# Patient Record
Sex: FEMALE | Race: WHITE | Employment: FULL TIME | URBAN - NONMETROPOLITAN AREA
[De-identification: names, ages, dates, MRNs, and addresses within clinical notes are randomized per-mention and may not be internally consistent; named-entity substitution may affect disease eponyms.]

---

## 2017-01-03 ENCOUNTER — TELEPHONE (OUTPATIENT)
Dept: UROLOGY | Age: 42
End: 2017-01-03

## 2017-01-03 DIAGNOSIS — N20.0 KIDNEY STONE: ICD-10-CM

## 2017-01-03 DIAGNOSIS — N20.1 OBSTRUCTION OF LEFT URETEROPELVIC JUNCTION (UPJ) DUE TO STONE: Primary | ICD-10-CM

## 2017-01-03 DIAGNOSIS — Z01.818 PRE-OP TESTING: ICD-10-CM

## 2017-01-04 ENCOUNTER — TELEPHONE (OUTPATIENT)
Dept: UROLOGY | Age: 42
End: 2017-01-04

## 2017-01-10 ENCOUNTER — PROCEDURE VISIT (OUTPATIENT)
Dept: UROLOGY | Age: 42
End: 2017-01-10

## 2017-01-10 VITALS
BODY MASS INDEX: 32.1 KG/M2 | WEIGHT: 188 LBS | DIASTOLIC BLOOD PRESSURE: 80 MMHG | SYSTOLIC BLOOD PRESSURE: 140 MMHG | HEIGHT: 64 IN

## 2017-01-10 DIAGNOSIS — N20.0 KIDNEY STONE: Primary | ICD-10-CM

## 2017-01-10 LAB
BILIRUBIN URINE: NEGATIVE
BLOOD URINE, POC: ABNORMAL
CHARACTER, URINE: CLEAR
COLOR, URINE: YELLOW
GLUCOSE URINE: NEGATIVE MG/DL
KETONES, URINE: NEGATIVE
LEUKOCYTE CLUMPS, URINE: ABNORMAL
NITRITE, URINE: NEGATIVE
PH, URINE: 6
PROTEIN, URINE: 30 MG/DL
SPECIFIC GRAVITY, URINE: 1.02 (ref 1–1.03)
UROBILINOGEN, URINE: 0.2 EU/DL

## 2017-01-10 PROCEDURE — 99999 PR OFFICE/OUTPT VISIT,PROCEDURE ONLY: CPT | Performed by: UROLOGY

## 2017-01-10 PROCEDURE — 81003 URINALYSIS AUTO W/O SCOPE: CPT | Performed by: UROLOGY

## 2017-01-12 ENCOUNTER — PROCEDURE VISIT (OUTPATIENT)
Dept: UROLOGY | Age: 42
End: 2017-01-12

## 2017-01-12 VITALS — BODY MASS INDEX: 31.65 KG/M2 | WEIGHT: 190 LBS | HEIGHT: 65 IN

## 2017-01-12 DIAGNOSIS — N20.1 OBSTRUCTION OF LEFT URETEROPELVIC JUNCTION (UPJ) DUE TO STONE: Primary | ICD-10-CM

## 2017-01-12 DIAGNOSIS — N20.0 KIDNEY STONE: ICD-10-CM

## 2017-01-12 LAB
BILIRUBIN URINE: NEGATIVE
BLOOD URINE, POC: ABNORMAL
CHARACTER, URINE: CLEAR
COLOR, URINE: YELLOW
GLUCOSE URINE: NEGATIVE MG/DL
KETONES, URINE: NEGATIVE
LEUKOCYTE CLUMPS, URINE: ABNORMAL
NITRITE, URINE: NEGATIVE
PH, URINE: 7
PROTEIN, URINE: 30 MG/DL
SPECIFIC GRAVITY, URINE: 1.02 (ref 1–1.03)
UROBILINOGEN, URINE: 0.2 EU/DL

## 2017-01-12 PROCEDURE — 52310 CYSTOSCOPY AND TREATMENT: CPT | Performed by: UROLOGY

## 2017-01-12 PROCEDURE — 81003 URINALYSIS AUTO W/O SCOPE: CPT | Performed by: UROLOGY

## 2017-01-12 PROCEDURE — 99024 POSTOP FOLLOW-UP VISIT: CPT | Performed by: UROLOGY

## 2017-01-25 ENCOUNTER — TELEPHONE (OUTPATIENT)
Dept: UROLOGY | Age: 42
End: 2017-01-25

## 2018-03-30 ENCOUNTER — HOSPITAL ENCOUNTER (OUTPATIENT)
Dept: WOMENS IMAGING | Age: 43
Discharge: HOME | End: 2018-03-30
Payer: COMMERCIAL

## 2018-03-30 DIAGNOSIS — Z12.31 VISIT FOR SCREENING MAMMOGRAM: ICD-10-CM

## 2018-03-30 PROCEDURE — 77063 BREAST TOMOSYNTHESIS BI: CPT

## 2018-04-05 ENCOUNTER — HOSPITAL ENCOUNTER (OUTPATIENT)
Dept: WOMENS IMAGING | Age: 43
Discharge: HOME | End: 2018-04-05
Payer: COMMERCIAL

## 2018-04-05 DIAGNOSIS — Z12.31 VISIT FOR SCREENING MAMMOGRAM: ICD-10-CM

## 2018-04-05 DIAGNOSIS — R92.2 BREAST DENSITY: ICD-10-CM

## 2018-04-05 PROCEDURE — 76642 ULTRASOUND BREAST LIMITED: CPT

## 2018-04-05 PROCEDURE — G0279 TOMOSYNTHESIS, MAMMO: HCPCS

## 2018-04-16 ENCOUNTER — HOSPITAL ENCOUNTER (OUTPATIENT)
Dept: WOMENS IMAGING | Age: 43
Discharge: HOME | End: 2018-04-16
Payer: COMMERCIAL

## 2018-04-16 VITALS — TEMPERATURE: 98.6 F | SYSTOLIC BLOOD PRESSURE: 146 MMHG | DIASTOLIC BLOOD PRESSURE: 93 MMHG | HEART RATE: 74 BPM

## 2018-04-16 DIAGNOSIS — N63.10 MASS OF RIGHT BREAST: ICD-10-CM

## 2018-04-16 PROCEDURE — 88305 TISSUE EXAM BY PATHOLOGIST: CPT

## 2018-04-16 PROCEDURE — 77065 DX MAMMO INCL CAD UNI: CPT

## 2018-04-16 PROCEDURE — A4648 IMPLANTABLE TISSUE MARKER: HCPCS

## 2018-04-16 PROCEDURE — 19083 BX BREAST 1ST LESION US IMAG: CPT

## 2018-04-16 PROCEDURE — C1894 INTRO/SHEATH, NON-LASER: HCPCS

## 2018-04-16 RX ORDER — LEVOTHYROXINE AND LIOTHYRONINE 19; 4.5 UG/1; UG/1
30 TABLET ORAL DAILY
COMMUNITY

## 2018-10-22 ENCOUNTER — HOSPITAL ENCOUNTER (OUTPATIENT)
Dept: WOMENS IMAGING | Age: 43
Discharge: HOME | End: 2018-10-22
Payer: COMMERCIAL

## 2018-10-22 DIAGNOSIS — Z09 FOLLOW UP: ICD-10-CM

## 2018-10-22 DIAGNOSIS — N63.10 BREAST MASS, RIGHT: ICD-10-CM

## 2018-10-22 PROCEDURE — 76642 ULTRASOUND BREAST LIMITED: CPT

## 2019-02-05 ENCOUNTER — ANESTHESIA EVENT (OUTPATIENT)
Dept: OPERATING ROOM | Age: 44
End: 2019-02-05
Payer: COMMERCIAL

## 2019-02-05 ENCOUNTER — HOSPITAL ENCOUNTER (OUTPATIENT)
Age: 44
Setting detail: OBSERVATION
Discharge: HOME | End: 2019-02-06
Attending: UROLOGY | Admitting: UROLOGY
Payer: COMMERCIAL

## 2019-02-05 ENCOUNTER — ANESTHESIA (OUTPATIENT)
Dept: OPERATING ROOM | Age: 44
End: 2019-02-05
Payer: COMMERCIAL

## 2019-02-05 ENCOUNTER — APPOINTMENT (OUTPATIENT)
Dept: CT IMAGING | Age: 44
End: 2019-02-05
Payer: COMMERCIAL

## 2019-02-05 VITALS
SYSTOLIC BLOOD PRESSURE: 102 MMHG | DIASTOLIC BLOOD PRESSURE: 58 MMHG | RESPIRATION RATE: 4 BRPM | OXYGEN SATURATION: 94 %

## 2019-02-05 DIAGNOSIS — N20.1 URETEROLITHIASIS: Primary | ICD-10-CM

## 2019-02-05 DIAGNOSIS — N13.5 URETERAL STRICTURE: ICD-10-CM

## 2019-02-05 DIAGNOSIS — Q62.10 URETERAL STENOSIS: ICD-10-CM

## 2019-02-05 LAB
ALBUMIN SERPL-MCNC: 4.4 G/DL (ref 3.5–5.1)
ALP BLD-CCNC: 55 U/L (ref 38–126)
ALT SERPL-CCNC: 21 U/L (ref 11–66)
ANION GAP SERPL CALCULATED.3IONS-SCNC: 16 MEQ/L (ref 8–16)
AST SERPL-CCNC: 15 U/L (ref 5–40)
BACTERIA: ABNORMAL /HPF
BASOPHILS # BLD: 0.3 %
BASOPHILS ABSOLUTE: 0 THOU/MM3 (ref 0–0.1)
BILIRUB SERPL-MCNC: 0.2 MG/DL (ref 0.3–1.2)
BILIRUBIN DIRECT: < 0.2 MG/DL (ref 0–0.3)
BILIRUBIN URINE: ABNORMAL
BLOOD, URINE: ABNORMAL
BUN BLDV-MCNC: 14 MG/DL (ref 7–22)
CALCIUM SERPL-MCNC: 9.4 MG/DL (ref 8.5–10.5)
CASTS UA: ABNORMAL /LPF
CHARACTER, URINE: ABNORMAL
CHLORIDE BLD-SCNC: 104 MEQ/L (ref 98–111)
CO2: 21 MEQ/L (ref 23–33)
COLOR: ABNORMAL
CREAT SERPL-MCNC: 0.7 MG/DL (ref 0.4–1.2)
CRYSTALS, UA: ABNORMAL
EOSINOPHIL # BLD: 2.6 %
EOSINOPHILS ABSOLUTE: 0.3 THOU/MM3 (ref 0–0.4)
EPITHELIAL CELLS, UA: ABNORMAL /HPF
ERYTHROCYTE [DISTWIDTH] IN BLOOD BY AUTOMATED COUNT: 13.1 % (ref 11.5–14.5)
ERYTHROCYTE [DISTWIDTH] IN BLOOD BY AUTOMATED COUNT: 42.5 FL (ref 35–45)
GFR SERPL CREATININE-BSD FRML MDRD: > 90 ML/MIN/1.73M2
GLUCOSE BLD-MCNC: 129 MG/DL (ref 70–108)
GLUCOSE URINE: 100 MG/DL
HCT VFR BLD CALC: 42.1 % (ref 37–47)
HEMOGLOBIN: 13.5 GM/DL (ref 12–16)
ICTOTEST: NEGATIVE
IMMATURE GRANS (ABS): 0.06 THOU/MM3 (ref 0–0.07)
IMMATURE GRANULOCYTES: 0.6 %
KETONES, URINE: ABNORMAL
LEUKOCYTE ESTERASE, URINE: NEGATIVE
LIPASE: 40.1 U/L (ref 5.6–51.3)
LYMPHOCYTES # BLD: 19.6 %
LYMPHOCYTES ABSOLUTE: 2 THOU/MM3 (ref 1–4.8)
MCH RBC QN AUTO: 28.5 PG (ref 26–33)
MCHC RBC AUTO-ENTMCNC: 32.1 GM/DL (ref 32.2–35.5)
MCV RBC AUTO: 89 FL (ref 81–99)
MONOCYTES # BLD: 7 %
MONOCYTES ABSOLUTE: 0.7 THOU/MM3 (ref 0.4–1.3)
NITRITE, URINE: ABNORMAL
NUCLEATED RED BLOOD CELLS: 0 /100 WBC
OSMOLALITY CALCULATION: 283.4 MOSMOL/KG (ref 275–300)
PH UA: 5.5
PLATELET # BLD: 205 THOU/MM3 (ref 130–400)
PMV BLD AUTO: 10 FL (ref 9.4–12.4)
POTASSIUM SERPL-SCNC: 4.2 MEQ/L (ref 3.5–5.2)
PROTEIN UA: 100
RBC # BLD: 4.73 MILL/MM3 (ref 4.2–5.4)
RBC URINE: > 200 /HPF
SEG NEUTROPHILS: 69.9 %
SEGMENTED NEUTROPHILS ABSOLUTE COUNT: 7.3 THOU/MM3 (ref 1.8–7.7)
SODIUM BLD-SCNC: 141 MEQ/L (ref 135–145)
SPECIFIC GRAVITY, URINE: >= 1.03 (ref 1–1.03)
TOTAL PROTEIN: 7.1 G/DL (ref 6.1–8)
UROBILINOGEN, URINE: 1 EU/DL
WBC # BLD: 10.4 THOU/MM3 (ref 4.8–10.8)
WBC UA: ABNORMAL /HPF

## 2019-02-05 PROCEDURE — 52344 CYSTO/URETERO STRICTURE TX: CPT | Performed by: UROLOGY

## 2019-02-05 PROCEDURE — 3700000000 HC ANESTHESIA ATTENDED CARE: Performed by: UROLOGY

## 2019-02-05 PROCEDURE — 52345 CYSTO/URETERO W/UP STRICTURE: CPT | Performed by: UROLOGY

## 2019-02-05 PROCEDURE — 96361 HYDRATE IV INFUSION ADD-ON: CPT

## 2019-02-05 PROCEDURE — G0378 HOSPITAL OBSERVATION PER HR: HCPCS

## 2019-02-05 PROCEDURE — 36415 COLL VENOUS BLD VENIPUNCTURE: CPT

## 2019-02-05 PROCEDURE — 96374 THER/PROPH/DIAG INJ IV PUSH: CPT

## 2019-02-05 PROCEDURE — 96375 TX/PRO/DX INJ NEW DRUG ADDON: CPT

## 2019-02-05 PROCEDURE — 99999 PR OFFICE/OUTPT VISIT,PROCEDURE ONLY: CPT | Performed by: UROLOGY

## 2019-02-05 PROCEDURE — 6370000000 HC RX 637 (ALT 250 FOR IP): Performed by: NURSE PRACTITIONER

## 2019-02-05 PROCEDURE — 7100000000 HC PACU RECOVERY - FIRST 15 MIN: Performed by: UROLOGY

## 2019-02-05 PROCEDURE — 74176 CT ABD & PELVIS W/O CONTRAST: CPT

## 2019-02-05 PROCEDURE — 96376 TX/PRO/DX INJ SAME DRUG ADON: CPT

## 2019-02-05 PROCEDURE — 6360000002 HC RX W HCPCS: Performed by: NURSE PRACTITIONER

## 2019-02-05 PROCEDURE — 2580000003 HC RX 258: Performed by: NURSE PRACTITIONER

## 2019-02-05 PROCEDURE — 87086 URINE CULTURE/COLONY COUNT: CPT

## 2019-02-05 PROCEDURE — C1726 CATH, BAL DIL, NON-VASCULAR: HCPCS | Performed by: UROLOGY

## 2019-02-05 PROCEDURE — C1769 GUIDE WIRE: HCPCS | Performed by: UROLOGY

## 2019-02-05 PROCEDURE — 96372 THER/PROPH/DIAG INJ SC/IM: CPT

## 2019-02-05 PROCEDURE — 7100000001 HC PACU RECOVERY - ADDTL 15 MIN: Performed by: UROLOGY

## 2019-02-05 PROCEDURE — 80048 BASIC METABOLIC PNL TOTAL CA: CPT

## 2019-02-05 PROCEDURE — 3600000013 HC SURGERY LEVEL 3 ADDTL 15MIN: Performed by: UROLOGY

## 2019-02-05 PROCEDURE — 85025 COMPLETE CBC W/AUTO DIFF WBC: CPT

## 2019-02-05 PROCEDURE — 80076 HEPATIC FUNCTION PANEL: CPT

## 2019-02-05 PROCEDURE — 2709999900 HC NON-CHARGEABLE SUPPLY: Performed by: UROLOGY

## 2019-02-05 PROCEDURE — 2709999900 HC NON-CHARGEABLE SUPPLY

## 2019-02-05 PROCEDURE — 2500000003 HC RX 250 WO HCPCS: Performed by: NURSE ANESTHETIST, CERTIFIED REGISTERED

## 2019-02-05 PROCEDURE — 99999 PR OFFICE/OUTPT VISIT,PROCEDURE ONLY: CPT | Performed by: NURSE PRACTITIONER

## 2019-02-05 PROCEDURE — 81001 URINALYSIS AUTO W/SCOPE: CPT

## 2019-02-05 PROCEDURE — 6360000004 HC RX CONTRAST MEDICATION: Performed by: UROLOGY

## 2019-02-05 PROCEDURE — 6360000002 HC RX W HCPCS

## 2019-02-05 PROCEDURE — 82365 CALCULUS SPECTROSCOPY: CPT

## 2019-02-05 PROCEDURE — 6360000002 HC RX W HCPCS: Performed by: NURSE ANESTHETIST, CERTIFIED REGISTERED

## 2019-02-05 PROCEDURE — 52332 CYSTOSCOPY AND TREATMENT: CPT | Performed by: UROLOGY

## 2019-02-05 PROCEDURE — 94640 AIRWAY INHALATION TREATMENT: CPT

## 2019-02-05 PROCEDURE — 99285 EMERGENCY DEPT VISIT HI MDM: CPT

## 2019-02-05 PROCEDURE — C1773 RET DEV, INSERTABLE: HCPCS | Performed by: UROLOGY

## 2019-02-05 PROCEDURE — 3600000003 HC SURGERY LEVEL 3 BASE: Performed by: UROLOGY

## 2019-02-05 PROCEDURE — 6360000002 HC RX W HCPCS: Performed by: ANESTHESIOLOGY

## 2019-02-05 PROCEDURE — 2580000003 HC RX 258: Performed by: PHYSICIAN ASSISTANT

## 2019-02-05 PROCEDURE — 2580000003 HC RX 258: Performed by: NURSE ANESTHETIST, CERTIFIED REGISTERED

## 2019-02-05 PROCEDURE — APPSS180 APP SPLIT SHARED TIME > 60 MINUTES: Performed by: NURSE PRACTITIONER

## 2019-02-05 PROCEDURE — 83690 ASSAY OF LIPASE: CPT

## 2019-02-05 PROCEDURE — 99220 PR INITIAL OBSERVATION CARE/DAY 70 MINUTES: CPT | Performed by: UROLOGY

## 2019-02-05 PROCEDURE — C2617 STENT, NON-COR, TEM W/O DEL: HCPCS | Performed by: UROLOGY

## 2019-02-05 PROCEDURE — 3700000001 HC ADD 15 MINUTES (ANESTHESIA): Performed by: UROLOGY

## 2019-02-05 DEVICE — STENT URET 7FR L22-30CM PERCFLX HYDR+ SFT PGTL TAPR TIP: Type: IMPLANTABLE DEVICE | Site: URETER | Status: FUNCTIONAL

## 2019-02-05 RX ORDER — HYDROCODONE BITARTRATE AND ACETAMINOPHEN 5; 325 MG/1; MG/1
2 TABLET ORAL EVERY 4 HOURS PRN
Status: DISCONTINUED | OUTPATIENT
Start: 2019-02-05 | End: 2019-02-06

## 2019-02-05 RX ORDER — ONDANSETRON 2 MG/ML
INJECTION INTRAMUSCULAR; INTRAVENOUS
Status: COMPLETED
Start: 2019-02-05 | End: 2019-02-05

## 2019-02-05 RX ORDER — DIPHENHYDRAMINE HYDROCHLORIDE 50 MG/ML
12.5 INJECTION INTRAMUSCULAR; INTRAVENOUS
Status: DISCONTINUED | OUTPATIENT
Start: 2019-02-05 | End: 2019-02-05 | Stop reason: HOSPADM

## 2019-02-05 RX ORDER — FENTANYL CITRATE 50 UG/ML
50 INJECTION, SOLUTION INTRAMUSCULAR; INTRAVENOUS EVERY 5 MIN PRN
Status: DISCONTINUED | OUTPATIENT
Start: 2019-02-05 | End: 2019-02-05 | Stop reason: HOSPADM

## 2019-02-05 RX ORDER — 0.9 % SODIUM CHLORIDE 0.9 %
1000 INTRAVENOUS SOLUTION INTRAVENOUS ONCE
Status: COMPLETED | OUTPATIENT
Start: 2019-02-05 | End: 2019-02-05

## 2019-02-05 RX ORDER — SODIUM CHLORIDE 0.9 % (FLUSH) 0.9 %
10 SYRINGE (ML) INJECTION PRN
Status: DISCONTINUED | OUTPATIENT
Start: 2019-02-05 | End: 2019-02-05 | Stop reason: SDUPTHER

## 2019-02-05 RX ORDER — OXYBUTYNIN CHLORIDE 5 MG/1
5 TABLET ORAL EVERY 8 HOURS PRN
Status: DISCONTINUED | OUTPATIENT
Start: 2019-02-05 | End: 2019-02-06 | Stop reason: HOSPADM

## 2019-02-05 RX ORDER — SODIUM CHLORIDE 0.9 % (FLUSH) 0.9 %
10 SYRINGE (ML) INJECTION EVERY 12 HOURS SCHEDULED
Status: DISCONTINUED | OUTPATIENT
Start: 2019-02-05 | End: 2019-02-06 | Stop reason: HOSPADM

## 2019-02-05 RX ORDER — CIPROFLOXACIN 2 MG/ML
400 INJECTION, SOLUTION INTRAVENOUS EVERY 12 HOURS
Status: DISCONTINUED | OUTPATIENT
Start: 2019-02-05 | End: 2019-02-05

## 2019-02-05 RX ORDER — ROCURONIUM BROMIDE 10 MG/ML
INJECTION, SOLUTION INTRAVENOUS PRN
Status: DISCONTINUED | OUTPATIENT
Start: 2019-02-05 | End: 2019-02-05 | Stop reason: SDUPTHER

## 2019-02-05 RX ORDER — FENTANYL CITRATE 50 UG/ML
25 INJECTION, SOLUTION INTRAMUSCULAR; INTRAVENOUS EVERY 5 MIN PRN
Status: DISCONTINUED | OUTPATIENT
Start: 2019-02-05 | End: 2019-02-05 | Stop reason: HOSPADM

## 2019-02-05 RX ORDER — FAMOTIDINE 20 MG/1
40 TABLET, FILM COATED ORAL DAILY
Status: DISCONTINUED | OUTPATIENT
Start: 2019-02-05 | End: 2019-02-06 | Stop reason: HOSPADM

## 2019-02-05 RX ORDER — PROMETHAZINE HYDROCHLORIDE 25 MG/ML
12.5 INJECTION, SOLUTION INTRAMUSCULAR; INTRAVENOUS
Status: COMPLETED | OUTPATIENT
Start: 2019-02-05 | End: 2019-02-05

## 2019-02-05 RX ORDER — ONDANSETRON 2 MG/ML
4 INJECTION INTRAMUSCULAR; INTRAVENOUS EVERY 6 HOURS PRN
Status: DISCONTINUED | OUTPATIENT
Start: 2019-02-05 | End: 2019-02-06 | Stop reason: HOSPADM

## 2019-02-05 RX ORDER — METOCLOPRAMIDE HYDROCHLORIDE 5 MG/ML
10 INJECTION INTRAMUSCULAR; INTRAVENOUS
Status: DISCONTINUED | OUTPATIENT
Start: 2019-02-05 | End: 2019-02-05 | Stop reason: HOSPADM

## 2019-02-05 RX ORDER — GLYCOPYRROLATE 1 MG/5 ML
SYRINGE (ML) INTRAVENOUS PRN
Status: DISCONTINUED | OUTPATIENT
Start: 2019-02-05 | End: 2019-02-05 | Stop reason: SDUPTHER

## 2019-02-05 RX ORDER — KETOROLAC TROMETHAMINE 30 MG/ML
INJECTION, SOLUTION INTRAMUSCULAR; INTRAVENOUS
Status: COMPLETED
Start: 2019-02-05 | End: 2019-02-05

## 2019-02-05 RX ORDER — OXYCODONE HYDROCHLORIDE AND ACETAMINOPHEN 5; 325 MG/1; MG/1
1 TABLET ORAL EVERY 4 HOURS PRN
Status: DISCONTINUED | OUTPATIENT
Start: 2019-02-05 | End: 2019-02-05 | Stop reason: SDUPTHER

## 2019-02-05 RX ORDER — ACETAMINOPHEN 325 MG/1
650 TABLET ORAL EVERY 4 HOURS PRN
Status: DISCONTINUED | OUTPATIENT
Start: 2019-02-05 | End: 2019-02-05 | Stop reason: SDUPTHER

## 2019-02-05 RX ORDER — ACETAMINOPHEN 325 MG/1
650 TABLET ORAL EVERY 4 HOURS PRN
Status: DISCONTINUED | OUTPATIENT
Start: 2019-02-05 | End: 2019-02-06 | Stop reason: HOSPADM

## 2019-02-05 RX ORDER — MORPHINE SULFATE 4 MG/ML
4 INJECTION, SOLUTION INTRAMUSCULAR; INTRAVENOUS
Status: DISCONTINUED | OUTPATIENT
Start: 2019-02-05 | End: 2019-02-06 | Stop reason: HOSPADM

## 2019-02-05 RX ORDER — SODIUM CHLORIDE 0.9 % (FLUSH) 0.9 %
10 SYRINGE (ML) INJECTION EVERY 12 HOURS SCHEDULED
Status: DISCONTINUED | OUTPATIENT
Start: 2019-02-05 | End: 2019-02-05 | Stop reason: SDUPTHER

## 2019-02-05 RX ORDER — LIDOCAINE HYDROCHLORIDE 10 MG/ML
INJECTION, SOLUTION INFILTRATION; PERINEURAL PRN
Status: DISCONTINUED | OUTPATIENT
Start: 2019-02-05 | End: 2019-02-05 | Stop reason: SDUPTHER

## 2019-02-05 RX ORDER — DOCUSATE SODIUM 100 MG/1
100 CAPSULE, LIQUID FILLED ORAL 2 TIMES DAILY
Status: DISCONTINUED | OUTPATIENT
Start: 2019-02-05 | End: 2019-02-06 | Stop reason: HOSPADM

## 2019-02-05 RX ORDER — SERTRALINE HYDROCHLORIDE 100 MG/1
100 TABLET, FILM COATED ORAL DAILY
Status: DISCONTINUED | OUTPATIENT
Start: 2019-02-05 | End: 2019-02-06 | Stop reason: HOSPADM

## 2019-02-05 RX ORDER — TAMSULOSIN HYDROCHLORIDE 0.4 MG/1
0.4 CAPSULE ORAL DAILY
Status: DISCONTINUED | OUTPATIENT
Start: 2019-02-05 | End: 2019-02-06 | Stop reason: HOSPADM

## 2019-02-05 RX ORDER — NEOSTIGMINE METHYLSULFATE 1 MG/ML
INJECTION, SOLUTION INTRAVENOUS PRN
Status: DISCONTINUED | OUTPATIENT
Start: 2019-02-05 | End: 2019-02-05 | Stop reason: SDUPTHER

## 2019-02-05 RX ORDER — SODIUM CHLORIDE 9 MG/ML
INJECTION, SOLUTION INTRAVENOUS CONTINUOUS
Status: DISCONTINUED | OUTPATIENT
Start: 2019-02-05 | End: 2019-02-05 | Stop reason: SDUPTHER

## 2019-02-05 RX ORDER — FENTANYL CITRATE 50 UG/ML
INJECTION, SOLUTION INTRAMUSCULAR; INTRAVENOUS PRN
Status: DISCONTINUED | OUTPATIENT
Start: 2019-02-05 | End: 2019-02-05 | Stop reason: SDUPTHER

## 2019-02-05 RX ORDER — SODIUM CHLORIDE 9 MG/ML
INJECTION, SOLUTION INTRAVENOUS CONTINUOUS PRN
Status: DISCONTINUED | OUTPATIENT
Start: 2019-02-05 | End: 2019-02-05 | Stop reason: SDUPTHER

## 2019-02-05 RX ORDER — SODIUM CHLORIDE 9 MG/ML
INJECTION, SOLUTION INTRAVENOUS CONTINUOUS
Status: DISCONTINUED | OUTPATIENT
Start: 2019-02-05 | End: 2019-02-06 | Stop reason: HOSPADM

## 2019-02-05 RX ORDER — OXYCODONE HYDROCHLORIDE AND ACETAMINOPHEN 5; 325 MG/1; MG/1
2 TABLET ORAL EVERY 4 HOURS PRN
Status: DISCONTINUED | OUTPATIENT
Start: 2019-02-05 | End: 2019-02-05 | Stop reason: SDUPTHER

## 2019-02-05 RX ORDER — MEPERIDINE HYDROCHLORIDE 25 MG/ML
12.5 INJECTION INTRAMUSCULAR; INTRAVENOUS; SUBCUTANEOUS EVERY 5 MIN PRN
Status: DISCONTINUED | OUTPATIENT
Start: 2019-02-05 | End: 2019-02-05 | Stop reason: HOSPADM

## 2019-02-05 RX ORDER — LABETALOL HYDROCHLORIDE 5 MG/ML
5 INJECTION, SOLUTION INTRAVENOUS EVERY 10 MIN PRN
Status: DISCONTINUED | OUTPATIENT
Start: 2019-02-05 | End: 2019-02-05 | Stop reason: HOSPADM

## 2019-02-05 RX ORDER — KETOROLAC TROMETHAMINE 30 MG/ML
30 INJECTION, SOLUTION INTRAMUSCULAR; INTRAVENOUS ONCE
Status: COMPLETED | OUTPATIENT
Start: 2019-02-05 | End: 2019-02-05

## 2019-02-05 RX ORDER — SODIUM CHLORIDE 0.9 % (FLUSH) 0.9 %
10 SYRINGE (ML) INJECTION PRN
Status: DISCONTINUED | OUTPATIENT
Start: 2019-02-05 | End: 2019-02-06 | Stop reason: HOSPADM

## 2019-02-05 RX ORDER — LOSARTAN POTASSIUM 25 MG/1
25 TABLET ORAL DAILY
Status: DISCONTINUED | OUTPATIENT
Start: 2019-02-05 | End: 2019-02-06 | Stop reason: HOSPADM

## 2019-02-05 RX ORDER — TAMSULOSIN HYDROCHLORIDE 0.4 MG/1
0.4 CAPSULE ORAL DAILY
Status: DISCONTINUED | OUTPATIENT
Start: 2019-02-05 | End: 2019-02-05 | Stop reason: SDUPTHER

## 2019-02-05 RX ORDER — PROPOFOL 10 MG/ML
INJECTION, EMULSION INTRAVENOUS PRN
Status: DISCONTINUED | OUTPATIENT
Start: 2019-02-05 | End: 2019-02-05 | Stop reason: SDUPTHER

## 2019-02-05 RX ORDER — ONDANSETRON 2 MG/ML
4 INJECTION INTRAMUSCULAR; INTRAVENOUS ONCE
Status: COMPLETED | OUTPATIENT
Start: 2019-02-05 | End: 2019-02-05

## 2019-02-05 RX ORDER — ONDANSETRON 2 MG/ML
INJECTION INTRAMUSCULAR; INTRAVENOUS PRN
Status: DISCONTINUED | OUTPATIENT
Start: 2019-02-05 | End: 2019-02-05 | Stop reason: SDUPTHER

## 2019-02-05 RX ORDER — HYDROCODONE BITARTRATE AND ACETAMINOPHEN 5; 325 MG/1; MG/1
1 TABLET ORAL EVERY 4 HOURS PRN
Status: DISCONTINUED | OUTPATIENT
Start: 2019-02-05 | End: 2019-02-06

## 2019-02-05 RX ORDER — LEVOTHYROXINE AND LIOTHYRONINE 38; 9 UG/1; UG/1
30 TABLET ORAL DAILY
Status: DISCONTINUED | OUTPATIENT
Start: 2019-02-05 | End: 2019-02-06 | Stop reason: HOSPADM

## 2019-02-05 RX ORDER — DEXAMETHASONE SODIUM PHOSPHATE 4 MG/ML
INJECTION, SOLUTION INTRA-ARTICULAR; INTRALESIONAL; INTRAMUSCULAR; INTRAVENOUS; SOFT TISSUE PRN
Status: DISCONTINUED | OUTPATIENT
Start: 2019-02-05 | End: 2019-02-05 | Stop reason: SDUPTHER

## 2019-02-05 RX ORDER — MORPHINE SULFATE 2 MG/ML
2 INJECTION, SOLUTION INTRAMUSCULAR; INTRAVENOUS
Status: DISCONTINUED | OUTPATIENT
Start: 2019-02-05 | End: 2019-02-06 | Stop reason: HOSPADM

## 2019-02-05 RX ORDER — ONDANSETRON 2 MG/ML
4 INJECTION INTRAMUSCULAR; INTRAVENOUS EVERY 6 HOURS PRN
Status: DISCONTINUED | OUTPATIENT
Start: 2019-02-05 | End: 2019-02-05 | Stop reason: SDUPTHER

## 2019-02-05 RX ORDER — KETOROLAC TROMETHAMINE 30 MG/ML
30 INJECTION, SOLUTION INTRAMUSCULAR; INTRAVENOUS EVERY 6 HOURS PRN
Status: DISCONTINUED | OUTPATIENT
Start: 2019-02-05 | End: 2019-02-06 | Stop reason: HOSPADM

## 2019-02-05 RX ORDER — MIDAZOLAM HYDROCHLORIDE 1 MG/ML
INJECTION INTRAMUSCULAR; INTRAVENOUS PRN
Status: DISCONTINUED | OUTPATIENT
Start: 2019-02-05 | End: 2019-02-05 | Stop reason: SDUPTHER

## 2019-02-05 RX ADMIN — FAMOTIDINE 40 MG: 20 TABLET, FILM COATED ORAL at 23:09

## 2019-02-05 RX ADMIN — ONDANSETRON 4 MG: 2 INJECTION INTRAMUSCULAR; INTRAVENOUS at 06:25

## 2019-02-05 RX ADMIN — FENTANYL CITRATE 50 MCG: 50 INJECTION INTRAMUSCULAR; INTRAVENOUS at 14:05

## 2019-02-05 RX ADMIN — ONDANSETRON HYDROCHLORIDE 4 MG: 4 INJECTION, SOLUTION INTRAMUSCULAR; INTRAVENOUS at 13:46

## 2019-02-05 RX ADMIN — Medication 2 PUFF: at 10:13

## 2019-02-05 RX ADMIN — ONDANSETRON 4 MG: 2 INJECTION INTRAMUSCULAR; INTRAVENOUS at 14:34

## 2019-02-05 RX ADMIN — NEOSTIGMINE METHYLSULFATE 4 MG: 1 INJECTION, SOLUTION INTRAVENOUS at 14:05

## 2019-02-05 RX ADMIN — SODIUM CHLORIDE: 9 INJECTION, SOLUTION INTRAVENOUS at 10:11

## 2019-02-05 RX ADMIN — MORPHINE SULFATE 4 MG: 4 INJECTION, SOLUTION INTRAMUSCULAR; INTRAVENOUS at 20:58

## 2019-02-05 RX ADMIN — PROPOFOL 200 MG: 10 INJECTION, EMULSION INTRAVENOUS at 13:31

## 2019-02-05 RX ADMIN — MORPHINE SULFATE 4 MG: 4 INJECTION, SOLUTION INTRAMUSCULAR; INTRAVENOUS at 19:01

## 2019-02-05 RX ADMIN — SODIUM CHLORIDE: 9 INJECTION, SOLUTION INTRAVENOUS at 23:16

## 2019-02-05 RX ADMIN — SODIUM CHLORIDE: 9 INJECTION, SOLUTION INTRAVENOUS at 13:26

## 2019-02-05 RX ADMIN — KETOROLAC TROMETHAMINE 30 MG: 30 INJECTION, SOLUTION INTRAMUSCULAR; INTRAVENOUS at 06:25

## 2019-02-05 RX ADMIN — FENTANYL CITRATE 50 MCG: 50 INJECTION INTRAMUSCULAR; INTRAVENOUS at 13:50

## 2019-02-05 RX ADMIN — Medication 10 ML: at 10:11

## 2019-02-05 RX ADMIN — PROMETHAZINE HYDROCHLORIDE 12.5 MG: 25 INJECTION INTRAMUSCULAR; INTRAVENOUS at 14:37

## 2019-02-05 RX ADMIN — KETOROLAC TROMETHAMINE 30 MG: 30 INJECTION, SOLUTION INTRAMUSCULAR at 06:25

## 2019-02-05 RX ADMIN — LIDOCAINE HYDROCHLORIDE 50 MG: 10 INJECTION, SOLUTION INFILTRATION; PERINEURAL at 13:31

## 2019-02-05 RX ADMIN — ROCURONIUM BROMIDE 30 MG: 10 INJECTION INTRAVENOUS at 13:31

## 2019-02-05 RX ADMIN — CIPROFLOXACIN 400 MG: 2 INJECTION, SOLUTION INTRAVENOUS at 10:42

## 2019-02-05 RX ADMIN — DOCUSATE SODIUM 100 MG: 100 CAPSULE, LIQUID FILLED ORAL at 20:44

## 2019-02-05 RX ADMIN — DOCUSATE SODIUM 100 MG: 100 CAPSULE, LIQUID FILLED ORAL at 10:42

## 2019-02-05 RX ADMIN — MORPHINE SULFATE 4 MG: 4 INJECTION, SOLUTION INTRAMUSCULAR; INTRAVENOUS at 14:32

## 2019-02-05 RX ADMIN — DEXAMETHASONE SODIUM PHOSPHATE 8 MG: 4 INJECTION, SOLUTION INTRAMUSCULAR; INTRAVENOUS at 13:45

## 2019-02-05 RX ADMIN — SUGAMMADEX 200 MG: 100 INJECTION, SOLUTION INTRAVENOUS at 14:14

## 2019-02-05 RX ADMIN — Medication 0.6 MG: at 14:05

## 2019-02-05 RX ADMIN — MIDAZOLAM HYDROCHLORIDE 2 MG: 1 INJECTION, SOLUTION INTRAMUSCULAR; INTRAVENOUS at 13:27

## 2019-02-05 RX ADMIN — LOSARTAN POTASSIUM 25 MG: 25 TABLET, FILM COATED ORAL at 10:42

## 2019-02-05 RX ADMIN — KETOROLAC TROMETHAMINE 30 MG: 30 INJECTION, SOLUTION INTRAMUSCULAR at 14:20

## 2019-02-05 RX ADMIN — ONDANSETRON 4 MG: 2 INJECTION INTRAMUSCULAR; INTRAVENOUS at 20:51

## 2019-02-05 RX ADMIN — KETOROLAC TROMETHAMINE 30 MG: 30 INJECTION, SOLUTION INTRAMUSCULAR at 20:48

## 2019-02-05 RX ADMIN — FENTANYL CITRATE 100 MCG: 50 INJECTION INTRAMUSCULAR; INTRAVENOUS at 13:28

## 2019-02-05 RX ADMIN — SODIUM CHLORIDE 1000 ML: 9 INJECTION, SOLUTION INTRAVENOUS at 06:25

## 2019-02-05 RX ADMIN — SERTRALINE 100 MG: 100 TABLET, FILM COATED ORAL at 20:53

## 2019-02-05 RX ADMIN — TAMSULOSIN HYDROCHLORIDE 0.4 MG: 0.4 CAPSULE ORAL at 10:42

## 2019-02-05 RX ADMIN — FENTANYL CITRATE 50 MCG: 50 INJECTION, SOLUTION INTRAMUSCULAR; INTRAVENOUS at 14:40

## 2019-02-05 RX ADMIN — MORPHINE SULFATE 4 MG: 4 INJECTION, SOLUTION INTRAMUSCULAR; INTRAVENOUS at 17:04

## 2019-02-05 RX ADMIN — HYDROCODONE BITARTRATE AND ACETAMINOPHEN 2 TABLET: 5; 325 TABLET ORAL at 16:05

## 2019-02-05 RX ADMIN — OXYBUTYNIN CHLORIDE 5 MG: 5 TABLET ORAL at 17:01

## 2019-02-05 RX ADMIN — MORPHINE SULFATE 4 MG: 4 INJECTION, SOLUTION INTRAMUSCULAR; INTRAVENOUS at 23:10

## 2019-02-05 ASSESSMENT — PULMONARY FUNCTION TESTS
PIF_VALUE: 18
PIF_VALUE: 17
PIF_VALUE: 19
PIF_VALUE: 20
PIF_VALUE: 3
PIF_VALUE: 7
PIF_VALUE: 18
PIF_VALUE: 17
PIF_VALUE: 19
PIF_VALUE: 18
PIF_VALUE: 19
PIF_VALUE: 0
PIF_VALUE: 18
PIF_VALUE: 1
PIF_VALUE: 2
PIF_VALUE: 4
PIF_VALUE: 18
PIF_VALUE: 2
PIF_VALUE: 18
PIF_VALUE: 19
PIF_VALUE: 17
PIF_VALUE: 18
PIF_VALUE: 19
PIF_VALUE: 1
PIF_VALUE: 20
PIF_VALUE: 17
PIF_VALUE: 1
PIF_VALUE: 18
PIF_VALUE: 18
PIF_VALUE: 17
PIF_VALUE: 18
PIF_VALUE: 19
PIF_VALUE: 22
PIF_VALUE: 18
PIF_VALUE: 18
PIF_VALUE: 2
PIF_VALUE: 18
PIF_VALUE: 4
PIF_VALUE: 18
PIF_VALUE: 28
PIF_VALUE: 18
PIF_VALUE: 2

## 2019-02-05 ASSESSMENT — ENCOUNTER SYMPTOMS
SHORTNESS OF BREATH: 0
RHINORRHEA: 0
NAUSEA: 1
VOMITING: 1
DIARRHEA: 0
SORE THROAT: 0
EYE PAIN: 0
COUGH: 0
WHEEZING: 0
BACK PAIN: 0
ABDOMINAL PAIN: 0
EYE DISCHARGE: 0

## 2019-02-05 ASSESSMENT — PAIN SCALES - GENERAL
PAINLEVEL_OUTOF10: 6
PAINLEVEL_OUTOF10: 7
PAINLEVEL_OUTOF10: 9
PAINLEVEL_OUTOF10: 6
PAINLEVEL_OUTOF10: 6
PAINLEVEL_OUTOF10: 10
PAINLEVEL_OUTOF10: 2
PAINLEVEL_OUTOF10: 7
PAINLEVEL_OUTOF10: 8
PAINLEVEL_OUTOF10: 8
PAINLEVEL_OUTOF10: 3
PAINLEVEL_OUTOF10: 8
PAINLEVEL_OUTOF10: 0
PAINLEVEL_OUTOF10: 0
PAINLEVEL_OUTOF10: 8
PAINLEVEL_OUTOF10: 7
PAINLEVEL_OUTOF10: 6
PAINLEVEL_OUTOF10: 7
PAINLEVEL_OUTOF10: 0

## 2019-02-05 ASSESSMENT — PAIN DESCRIPTION - ORIENTATION
ORIENTATION: LOWER
ORIENTATION: RIGHT
ORIENTATION: LOWER
ORIENTATION: LOWER
ORIENTATION: RIGHT
ORIENTATION: LOWER
ORIENTATION: RIGHT

## 2019-02-05 ASSESSMENT — PAIN DESCRIPTION - LOCATION
LOCATION: FLANK
LOCATION: ABDOMEN;PELVIS
LOCATION: FLANK;PELVIS
LOCATION: PELVIS
LOCATION: ABDOMEN;PELVIS
LOCATION: PELVIS
LOCATION: FLANK
LOCATION: PELVIS
LOCATION: FLANK
LOCATION: ABDOMEN;PELVIS
LOCATION: ABDOMEN;PELVIS

## 2019-02-05 ASSESSMENT — PAIN DESCRIPTION - PAIN TYPE
TYPE: SURGICAL PAIN
TYPE: ACUTE PAIN
TYPE: ACUTE PAIN
TYPE: SURGICAL PAIN
TYPE: ACUTE PAIN
TYPE: SURGICAL PAIN

## 2019-02-05 ASSESSMENT — PAIN DESCRIPTION - DESCRIPTORS
DESCRIPTORS: DISCOMFORT
DESCRIPTORS: CONSTANT;DISCOMFORT;SORE;TENDER;THROBBING
DESCRIPTORS: SHARP
DESCRIPTORS: CONSTANT;DISCOMFORT;TENDER
DESCRIPTORS: DISCOMFORT
DESCRIPTORS: CONSTANT;DISCOMFORT;TENDER
DESCRIPTORS: PRESSURE
DESCRIPTORS: SHARP
DESCRIPTORS: CONSTANT;DISCOMFORT;SORE;TENDER
DESCRIPTORS: SORE;DISCOMFORT

## 2019-02-05 ASSESSMENT — PAIN - FUNCTIONAL ASSESSMENT
PAIN_FUNCTIONAL_ASSESSMENT: ACTIVITIES ARE NOT PREVENTED
PAIN_FUNCTIONAL_ASSESSMENT: ACTIVITIES ARE NOT PREVENTED

## 2019-02-06 ENCOUNTER — TELEPHONE (OUTPATIENT)
Dept: UROLOGY | Age: 44
End: 2019-02-06

## 2019-02-06 VITALS
HEART RATE: 74 BPM | SYSTOLIC BLOOD PRESSURE: 146 MMHG | DIASTOLIC BLOOD PRESSURE: 71 MMHG | RESPIRATION RATE: 16 BRPM | OXYGEN SATURATION: 91 % | HEIGHT: 64 IN | TEMPERATURE: 98.5 F | WEIGHT: 185 LBS | BODY MASS INDEX: 31.58 KG/M2

## 2019-02-06 LAB
ORGANISM: ABNORMAL
URINE CULTURE REFLEX: ABNORMAL

## 2019-02-06 PROCEDURE — 6370000000 HC RX 637 (ALT 250 FOR IP): Performed by: NURSE PRACTITIONER

## 2019-02-06 PROCEDURE — 96376 TX/PRO/DX INJ SAME DRUG ADON: CPT

## 2019-02-06 PROCEDURE — 2580000003 HC RX 258: Performed by: NURSE PRACTITIONER

## 2019-02-06 PROCEDURE — 99999 PR OFFICE/OUTPT VISIT,PROCEDURE ONLY: CPT | Performed by: UROLOGY

## 2019-02-06 PROCEDURE — G0378 HOSPITAL OBSERVATION PER HR: HCPCS

## 2019-02-06 PROCEDURE — 6360000002 HC RX W HCPCS: Performed by: NURSE PRACTITIONER

## 2019-02-06 PROCEDURE — 99999 PR OFFICE/OUTPT VISIT,PROCEDURE ONLY: CPT | Performed by: NURSE PRACTITIONER

## 2019-02-06 PROCEDURE — 99217 PR OBSERVATION CARE DISCHARGE MANAGEMENT: CPT | Performed by: NURSE PRACTITIONER

## 2019-02-06 PROCEDURE — 2709999900 HC NON-CHARGEABLE SUPPLY

## 2019-02-06 PROCEDURE — 94640 AIRWAY INHALATION TREATMENT: CPT

## 2019-02-06 RX ORDER — KETOROLAC TROMETHAMINE 10 MG/1
10 TABLET, FILM COATED ORAL EVERY 6 HOURS PRN
Qty: 20 TABLET | Refills: 0 | Status: SHIPPED | OUTPATIENT
Start: 2019-02-06 | End: 2019-03-18

## 2019-02-06 RX ORDER — CIPROFLOXACIN 500 MG/1
500 TABLET, FILM COATED ORAL 2 TIMES DAILY
Qty: 10 TABLET | Refills: 0 | Status: SHIPPED | OUTPATIENT
Start: 2019-02-06 | End: 2019-02-11

## 2019-02-06 RX ORDER — HYDROCODONE BITARTRATE AND ACETAMINOPHEN 5; 325 MG/1; MG/1
1 TABLET ORAL EVERY 6 HOURS PRN
Qty: 12 TABLET | Refills: 0 | Status: SHIPPED | OUTPATIENT
Start: 2019-02-06 | End: 2019-02-06 | Stop reason: HOSPADM

## 2019-02-06 RX ORDER — ONDANSETRON 4 MG/1
4 TABLET, FILM COATED ORAL EVERY 12 HOURS PRN
Qty: 15 TABLET | Refills: 0 | Status: SHIPPED | OUTPATIENT
Start: 2019-02-06 | End: 2019-03-18

## 2019-02-06 RX ORDER — OXYCODONE HYDROCHLORIDE AND ACETAMINOPHEN 5; 325 MG/1; MG/1
1 TABLET ORAL EVERY 6 HOURS PRN
Qty: 12 TABLET | Refills: 0 | Status: SHIPPED | OUTPATIENT
Start: 2019-02-06 | End: 2019-02-09

## 2019-02-06 RX ORDER — OXYCODONE HYDROCHLORIDE AND ACETAMINOPHEN 5; 325 MG/1; MG/1
1 TABLET ORAL EVERY 4 HOURS PRN
Status: DISCONTINUED | OUTPATIENT
Start: 2019-02-06 | End: 2019-02-06 | Stop reason: HOSPADM

## 2019-02-06 RX ORDER — OXYBUTYNIN CHLORIDE 5 MG/1
5 TABLET ORAL EVERY 8 HOURS PRN
Qty: 30 TABLET | Refills: 1 | Status: SHIPPED | OUTPATIENT
Start: 2019-02-06 | End: 2022-01-20

## 2019-02-06 RX ORDER — OXYCODONE HYDROCHLORIDE AND ACETAMINOPHEN 5; 325 MG/1; MG/1
2 TABLET ORAL EVERY 4 HOURS PRN
Status: DISCONTINUED | OUTPATIENT
Start: 2019-02-06 | End: 2019-02-06 | Stop reason: HOSPADM

## 2019-02-06 RX ADMIN — KETOROLAC TROMETHAMINE 30 MG: 30 INJECTION, SOLUTION INTRAMUSCULAR at 08:56

## 2019-02-06 RX ADMIN — OXYBUTYNIN CHLORIDE 5 MG: 5 TABLET ORAL at 07:48

## 2019-02-06 RX ADMIN — SERTRALINE 100 MG: 100 TABLET, FILM COATED ORAL at 10:42

## 2019-02-06 RX ADMIN — OXYCODONE AND ACETAMINOPHEN 2 TABLET: 5; 325 TABLET ORAL at 13:11

## 2019-02-06 RX ADMIN — DOCUSATE SODIUM 100 MG: 100 CAPSULE, LIQUID FILLED ORAL at 10:41

## 2019-02-06 RX ADMIN — MORPHINE SULFATE 4 MG: 4 INJECTION, SOLUTION INTRAMUSCULAR; INTRAVENOUS at 02:46

## 2019-02-06 RX ADMIN — LEVOTHYROXINE, LIOTHYRONINE 30 MG: 38; 9 TABLET ORAL at 10:42

## 2019-02-06 RX ADMIN — LOSARTAN POTASSIUM 25 MG: 25 TABLET, FILM COATED ORAL at 10:42

## 2019-02-06 RX ADMIN — Medication 2 PUFF: at 09:18

## 2019-02-06 RX ADMIN — TAMSULOSIN HYDROCHLORIDE 0.4 MG: 0.4 CAPSULE ORAL at 10:42

## 2019-02-06 RX ADMIN — HYDROCODONE BITARTRATE AND ACETAMINOPHEN 1 TABLET: 5; 325 TABLET ORAL at 07:48

## 2019-02-06 RX ADMIN — MORPHINE SULFATE 4 MG: 4 INJECTION, SOLUTION INTRAMUSCULAR; INTRAVENOUS at 04:48

## 2019-02-06 RX ADMIN — ONDANSETRON 4 MG: 2 INJECTION INTRAMUSCULAR; INTRAVENOUS at 08:58

## 2019-02-06 RX ADMIN — SODIUM CHLORIDE: 9 INJECTION, SOLUTION INTRAVENOUS at 10:44

## 2019-02-06 ASSESSMENT — PAIN SCALES - GENERAL
PAINLEVEL_OUTOF10: 7
PAINLEVEL_OUTOF10: 0
PAINLEVEL_OUTOF10: 10
PAINLEVEL_OUTOF10: 7
PAINLEVEL_OUTOF10: 6
PAINLEVEL_OUTOF10: 6
PAINLEVEL_OUTOF10: 8
PAINLEVEL_OUTOF10: 0

## 2019-02-06 ASSESSMENT — PAIN DESCRIPTION - DESCRIPTORS: DESCRIPTORS: CRAMPING;DISCOMFORT

## 2019-02-06 ASSESSMENT — PAIN DESCRIPTION - LOCATION
LOCATION: ABDOMEN;PELVIS
LOCATION: ABDOMEN;PELVIS
LOCATION: ABDOMEN

## 2019-02-06 ASSESSMENT — PAIN DESCRIPTION - PAIN TYPE
TYPE: SURGICAL PAIN
TYPE: ACUTE PAIN;SURGICAL PAIN
TYPE: SURGICAL PAIN

## 2019-02-06 ASSESSMENT — PAIN DESCRIPTION - ORIENTATION
ORIENTATION: LOWER
ORIENTATION: LOWER
ORIENTATION: LOWER;RIGHT

## 2019-02-06 ASSESSMENT — PAIN DESCRIPTION - ONSET: ONSET: GRADUAL

## 2019-02-06 ASSESSMENT — PAIN - FUNCTIONAL ASSESSMENT: PAIN_FUNCTIONAL_ASSESSMENT: ACTIVITIES ARE NOT PREVENTED

## 2019-02-06 ASSESSMENT — PAIN DESCRIPTION - FREQUENCY: FREQUENCY: INTERMITTENT

## 2019-02-08 ENCOUNTER — TELEPHONE (OUTPATIENT)
Dept: UROLOGY | Age: 44
End: 2019-02-08

## 2019-02-10 LAB — STONE ANALYSIS: NORMAL

## 2019-02-11 ENCOUNTER — TELEPHONE (OUTPATIENT)
Dept: UROLOGY | Age: 44
End: 2019-02-11

## 2019-02-13 ENCOUNTER — TELEPHONE (OUTPATIENT)
Dept: UROLOGY | Age: 44
End: 2019-02-13

## 2019-02-15 ENCOUNTER — TELEPHONE (OUTPATIENT)
Dept: UROLOGY | Age: 44
End: 2019-02-15

## 2019-02-15 DIAGNOSIS — R23.2 HOT FLASHES: ICD-10-CM

## 2019-02-15 DIAGNOSIS — R68.83 CHILLS: Primary | ICD-10-CM

## 2019-02-19 ENCOUNTER — TELEPHONE (OUTPATIENT)
Dept: UROLOGY | Age: 44
End: 2019-02-19

## 2019-02-21 ENCOUNTER — PROCEDURE VISIT (OUTPATIENT)
Dept: UROLOGY | Age: 44
End: 2019-02-21
Payer: COMMERCIAL

## 2019-02-21 VITALS
WEIGHT: 179 LBS | HEIGHT: 64 IN | SYSTOLIC BLOOD PRESSURE: 122 MMHG | DIASTOLIC BLOOD PRESSURE: 84 MMHG | BODY MASS INDEX: 30.56 KG/M2

## 2019-02-21 DIAGNOSIS — N20.1 OBSTRUCTION OF RIGHT URETEROPELVIC JUNCTION (UPJ) DUE TO STONE: Primary | ICD-10-CM

## 2019-02-21 LAB
BILIRUBIN URINE: ABNORMAL
BLOOD URINE, POC: ABNORMAL
CHARACTER, URINE: ABNORMAL
COLOR, URINE: ABNORMAL
GLUCOSE URINE: NEGATIVE MG/DL
KETONES, URINE: ABNORMAL
LEUKOCYTE CLUMPS, URINE: ABNORMAL
NITRITE, URINE: NEGATIVE
PH, URINE: 7
PROTEIN, URINE: 100 MG/DL
SPECIFIC GRAVITY, URINE: 1.02 (ref 1–1.03)
UROBILINOGEN, URINE: 0.2 EU/DL

## 2019-02-21 PROCEDURE — 81003 URINALYSIS AUTO W/O SCOPE: CPT | Performed by: UROLOGY

## 2019-02-21 PROCEDURE — 99999 PR OFFICE/OUTPT VISIT,PROCEDURE ONLY: CPT | Performed by: UROLOGY

## 2019-02-21 PROCEDURE — 52310 CYSTOSCOPY AND TREATMENT: CPT | Performed by: UROLOGY

## 2019-02-26 ENCOUNTER — HOSPITAL ENCOUNTER (OUTPATIENT)
Age: 44
Setting detail: OUTPATIENT SURGERY
Discharge: HOME | End: 2019-02-26
Attending: UROLOGY | Admitting: UROLOGY
Payer: COMMERCIAL

## 2019-02-26 ENCOUNTER — TELEPHONE (OUTPATIENT)
Dept: UROLOGY | Age: 44
End: 2019-02-26

## 2019-02-26 ENCOUNTER — ANESTHESIA (OUTPATIENT)
Dept: OPERATING ROOM | Age: 44
End: 2019-02-26
Payer: COMMERCIAL

## 2019-02-26 ENCOUNTER — ANESTHESIA EVENT (OUTPATIENT)
Dept: OPERATING ROOM | Age: 44
End: 2019-02-26
Payer: COMMERCIAL

## 2019-02-26 VITALS
HEIGHT: 64 IN | SYSTOLIC BLOOD PRESSURE: 155 MMHG | RESPIRATION RATE: 16 BRPM | OXYGEN SATURATION: 97 % | WEIGHT: 182.54 LBS | BODY MASS INDEX: 31.16 KG/M2 | DIASTOLIC BLOOD PRESSURE: 84 MMHG | TEMPERATURE: 97.8 F | HEART RATE: 63 BPM

## 2019-02-26 VITALS
DIASTOLIC BLOOD PRESSURE: 84 MMHG | OXYGEN SATURATION: 100 % | SYSTOLIC BLOOD PRESSURE: 140 MMHG | RESPIRATION RATE: 7 BRPM

## 2019-02-26 DIAGNOSIS — N13.5 URETERAL STRICTURE: Primary | ICD-10-CM

## 2019-02-26 LAB
BACTERIA: ABNORMAL /HPF
BILIRUBIN URINE: NEGATIVE
BLOOD, URINE: ABNORMAL
CASTS 2: ABNORMAL /LPF
CASTS UA: ABNORMAL /LPF
CHARACTER, URINE: ABNORMAL
COLOR: YELLOW
CRYSTALS, UA: ABNORMAL
EPITHELIAL CELLS, UA: ABNORMAL /HPF
GLUCOSE URINE: NEGATIVE MG/DL
HCT VFR BLD CALC: 39.4 % (ref 37–47)
HEMOGLOBIN: 12.5 GM/DL (ref 12–16)
KETONES, URINE: NEGATIVE
LEUKOCYTE ESTERASE, URINE: ABNORMAL
MISCELLANEOUS 2: ABNORMAL
MUCUS: ABNORMAL
NITRITE, URINE: NEGATIVE
PH UA: 6
PROTEIN UA: NEGATIVE
RBC URINE: ABNORMAL /HPF
RENAL EPITHELIAL, UA: ABNORMAL
SPECIFIC GRAVITY, URINE: 1.02 (ref 1–1.03)
UROBILINOGEN, URINE: 0.2 EU/DL
WBC UA: ABNORMAL /HPF
YEAST: ABNORMAL

## 2019-02-26 PROCEDURE — 7100000011 HC PHASE II RECOVERY - ADDTL 15 MIN: Performed by: UROLOGY

## 2019-02-26 PROCEDURE — 7100000010 HC PHASE II RECOVERY - FIRST 15 MIN: Performed by: UROLOGY

## 2019-02-26 PROCEDURE — 36415 COLL VENOUS BLD VENIPUNCTURE: CPT

## 2019-02-26 PROCEDURE — 3600000013 HC SURGERY LEVEL 3 ADDTL 15MIN: Performed by: UROLOGY

## 2019-02-26 PROCEDURE — 52345 CYSTO/URETERO W/UP STRICTURE: CPT | Performed by: UROLOGY

## 2019-02-26 PROCEDURE — 99999 PR OFFICE/OUTPT VISIT,PROCEDURE ONLY: CPT | Performed by: UROLOGY

## 2019-02-26 PROCEDURE — 52332 CYSTOSCOPY AND TREATMENT: CPT | Performed by: UROLOGY

## 2019-02-26 PROCEDURE — 7100000001 HC PACU RECOVERY - ADDTL 15 MIN: Performed by: UROLOGY

## 2019-02-26 PROCEDURE — 52344 CYSTO/URETERO STRICTURE TX: CPT | Performed by: UROLOGY

## 2019-02-26 PROCEDURE — 2709999900 HC NON-CHARGEABLE SUPPLY: Performed by: UROLOGY

## 2019-02-26 PROCEDURE — 2580000003 HC RX 258

## 2019-02-26 PROCEDURE — 85014 HEMATOCRIT: CPT

## 2019-02-26 PROCEDURE — 81001 URINALYSIS AUTO W/SCOPE: CPT

## 2019-02-26 PROCEDURE — 6360000002 HC RX W HCPCS

## 2019-02-26 PROCEDURE — 6370000000 HC RX 637 (ALT 250 FOR IP): Performed by: NURSE PRACTITIONER

## 2019-02-26 PROCEDURE — 6360000002 HC RX W HCPCS: Performed by: ANESTHESIOLOGY

## 2019-02-26 PROCEDURE — 6360000002 HC RX W HCPCS: Performed by: NURSE PRACTITIONER

## 2019-02-26 PROCEDURE — 7100000000 HC PACU RECOVERY - FIRST 15 MIN: Performed by: UROLOGY

## 2019-02-26 PROCEDURE — 3600000003 HC SURGERY LEVEL 3 BASE: Performed by: UROLOGY

## 2019-02-26 PROCEDURE — 3700000000 HC ANESTHESIA ATTENDED CARE: Performed by: UROLOGY

## 2019-02-26 PROCEDURE — C1726 CATH, BAL DIL, NON-VASCULAR: HCPCS | Performed by: UROLOGY

## 2019-02-26 PROCEDURE — 3700000001 HC ADD 15 MINUTES (ANESTHESIA): Performed by: UROLOGY

## 2019-02-26 PROCEDURE — 87086 URINE CULTURE/COLONY COUNT: CPT

## 2019-02-26 PROCEDURE — 2500000003 HC RX 250 WO HCPCS: Performed by: ANESTHESIOLOGY

## 2019-02-26 PROCEDURE — 6370000000 HC RX 637 (ALT 250 FOR IP)

## 2019-02-26 PROCEDURE — 2500000003 HC RX 250 WO HCPCS

## 2019-02-26 PROCEDURE — C2617 STENT, NON-COR, TEM W/O DEL: HCPCS | Performed by: UROLOGY

## 2019-02-26 PROCEDURE — C1769 GUIDE WIRE: HCPCS | Performed by: UROLOGY

## 2019-02-26 PROCEDURE — 85018 HEMOGLOBIN: CPT

## 2019-02-26 DEVICE — BLACK SILICONE FILIFORM DOUBLE PIGTAIL URETERAL STENT SET WIRE GUIDE WITH HYDROPHILIC COATING
Type: IMPLANTABLE DEVICE | Site: URETER | Status: FUNCTIONAL
Brand: BLACK SILICONE

## 2019-02-26 RX ORDER — ATROPA BELLADONNA AND OPIUM 16.2; 3 MG/1; MG/1
30 SUPPOSITORY RECTAL EVERY 8 HOURS PRN
Status: DISCONTINUED | OUTPATIENT
Start: 2019-02-26 | End: 2019-02-26 | Stop reason: HOSPADM

## 2019-02-26 RX ORDER — MORPHINE SULFATE 2 MG/ML
4 INJECTION, SOLUTION INTRAMUSCULAR; INTRAVENOUS
Status: DISCONTINUED | OUTPATIENT
Start: 2019-02-26 | End: 2019-02-26 | Stop reason: HOSPADM

## 2019-02-26 RX ORDER — SODIUM CHLORIDE 9 MG/ML
INJECTION, SOLUTION INTRAVENOUS CONTINUOUS
Status: DISCONTINUED | OUTPATIENT
Start: 2019-02-26 | End: 2019-02-26 | Stop reason: HOSPADM

## 2019-02-26 RX ORDER — KETOROLAC TROMETHAMINE 30 MG/ML
30 INJECTION, SOLUTION INTRAMUSCULAR; INTRAVENOUS EVERY 6 HOURS PRN
Status: DISCONTINUED | OUTPATIENT
Start: 2019-02-26 | End: 2019-02-26 | Stop reason: HOSPADM

## 2019-02-26 RX ORDER — ONDANSETRON 2 MG/ML
4 INJECTION INTRAMUSCULAR; INTRAVENOUS EVERY 4 HOURS PRN
Status: DISCONTINUED | OUTPATIENT
Start: 2019-02-26 | End: 2019-02-26 | Stop reason: HOSPADM

## 2019-02-26 RX ORDER — PROPOFOL 10 MG/ML
INJECTION, EMULSION INTRAVENOUS PRN
Status: DISCONTINUED | OUTPATIENT
Start: 2019-02-26 | End: 2019-02-26 | Stop reason: SDUPTHER

## 2019-02-26 RX ORDER — OXYCODONE HYDROCHLORIDE AND ACETAMINOPHEN 5; 325 MG/1; MG/1
1 TABLET ORAL EVERY 6 HOURS PRN
Qty: 10 TABLET | Refills: 0 | Status: SHIPPED | OUTPATIENT
Start: 2019-02-26 | End: 2019-03-03

## 2019-02-26 RX ORDER — DEXAMETHASONE SODIUM PHOSPHATE 4 MG/ML
INJECTION, SOLUTION INTRA-ARTICULAR; INTRALESIONAL; INTRAMUSCULAR; INTRAVENOUS; SOFT TISSUE PRN
Status: DISCONTINUED | OUTPATIENT
Start: 2019-02-26 | End: 2019-02-26 | Stop reason: SDUPTHER

## 2019-02-26 RX ORDER — OXYCODONE HYDROCHLORIDE AND ACETAMINOPHEN 5; 325 MG/1; MG/1
1 TABLET ORAL ONCE
Status: COMPLETED | OUTPATIENT
Start: 2019-02-26 | End: 2019-02-26

## 2019-02-26 RX ORDER — LIDOCAINE HYDROCHLORIDE 20 MG/ML
INJECTION, SOLUTION EPIDURAL; INFILTRATION; INTRACAUDAL; PERINEURAL PRN
Status: DISCONTINUED | OUTPATIENT
Start: 2019-02-26 | End: 2019-02-26 | Stop reason: SDUPTHER

## 2019-02-26 RX ORDER — MORPHINE SULFATE 2 MG/ML
2 INJECTION, SOLUTION INTRAMUSCULAR; INTRAVENOUS
Status: DISCONTINUED | OUTPATIENT
Start: 2019-02-26 | End: 2019-02-26 | Stop reason: HOSPADM

## 2019-02-26 RX ORDER — OXYCODONE HYDROCHLORIDE AND ACETAMINOPHEN 5; 325 MG/1; MG/1
TABLET ORAL
Status: COMPLETED
Start: 2019-02-26 | End: 2019-02-26

## 2019-02-26 RX ORDER — MIDAZOLAM HYDROCHLORIDE 1 MG/ML
INJECTION INTRAMUSCULAR; INTRAVENOUS PRN
Status: DISCONTINUED | OUTPATIENT
Start: 2019-02-26 | End: 2019-02-26 | Stop reason: SDUPTHER

## 2019-02-26 RX ORDER — ONDANSETRON 2 MG/ML
INJECTION INTRAMUSCULAR; INTRAVENOUS PRN
Status: DISCONTINUED | OUTPATIENT
Start: 2019-02-26 | End: 2019-02-26 | Stop reason: SDUPTHER

## 2019-02-26 RX ORDER — CIPROFLOXACIN 500 MG/1
500 TABLET, FILM COATED ORAL 2 TIMES DAILY
Qty: 6 TABLET | Refills: 0 | Status: SHIPPED | OUTPATIENT
Start: 2019-02-26 | End: 2019-03-01

## 2019-02-26 RX ORDER — HYDROCODONE BITARTRATE AND ACETAMINOPHEN 5; 325 MG/1; MG/1
1 TABLET ORAL EVERY 4 HOURS PRN
Status: DISCONTINUED | OUTPATIENT
Start: 2019-02-26 | End: 2019-02-26

## 2019-02-26 RX ORDER — CIPROFLOXACIN 2 MG/ML
400 INJECTION, SOLUTION INTRAVENOUS
Status: COMPLETED | OUTPATIENT
Start: 2019-02-26 | End: 2019-02-26

## 2019-02-26 RX ORDER — FENTANYL CITRATE 50 UG/ML
50 INJECTION, SOLUTION INTRAMUSCULAR; INTRAVENOUS EVERY 5 MIN PRN
Status: DISCONTINUED | OUTPATIENT
Start: 2019-02-26 | End: 2019-02-26 | Stop reason: HOSPADM

## 2019-02-26 RX ORDER — FENTANYL CITRATE 50 UG/ML
INJECTION, SOLUTION INTRAMUSCULAR; INTRAVENOUS PRN
Status: DISCONTINUED | OUTPATIENT
Start: 2019-02-26 | End: 2019-02-26 | Stop reason: SDUPTHER

## 2019-02-26 RX ORDER — MEPERIDINE HYDROCHLORIDE 25 MG/ML
25 INJECTION INTRAMUSCULAR; INTRAVENOUS; SUBCUTANEOUS EVERY 5 MIN PRN
Status: DISCONTINUED | OUTPATIENT
Start: 2019-02-26 | End: 2019-02-26 | Stop reason: HOSPADM

## 2019-02-26 RX ORDER — LABETALOL HYDROCHLORIDE 5 MG/ML
5 INJECTION, SOLUTION INTRAVENOUS EVERY 10 MIN PRN
Status: DISCONTINUED | OUTPATIENT
Start: 2019-02-26 | End: 2019-02-26 | Stop reason: HOSPADM

## 2019-02-26 RX ORDER — ONDANSETRON 2 MG/ML
4 INJECTION INTRAMUSCULAR; INTRAVENOUS
Status: DISCONTINUED | OUTPATIENT
Start: 2019-02-26 | End: 2019-02-26 | Stop reason: HOSPADM

## 2019-02-26 RX ORDER — HYDROCODONE BITARTRATE AND ACETAMINOPHEN 5; 325 MG/1; MG/1
2 TABLET ORAL EVERY 4 HOURS PRN
Status: DISCONTINUED | OUTPATIENT
Start: 2019-02-26 | End: 2019-02-26

## 2019-02-26 RX ORDER — MEPERIDINE HYDROCHLORIDE 25 MG/ML
12.5 INJECTION INTRAMUSCULAR; INTRAVENOUS; SUBCUTANEOUS EVERY 5 MIN PRN
Status: DISCONTINUED | OUTPATIENT
Start: 2019-02-26 | End: 2019-02-26

## 2019-02-26 RX ADMIN — ONDANSETRON HYDROCHLORIDE 4 MG: 4 INJECTION, SOLUTION INTRAMUSCULAR; INTRAVENOUS at 09:29

## 2019-02-26 RX ADMIN — FENTANYL CITRATE 50 MCG: 50 INJECTION INTRAMUSCULAR; INTRAVENOUS at 09:15

## 2019-02-26 RX ADMIN — FENTANYL CITRATE 50 MCG: 50 INJECTION INTRAMUSCULAR; INTRAVENOUS at 09:28

## 2019-02-26 RX ADMIN — KETOROLAC TROMETHAMINE 30 MG: 30 INJECTION, SOLUTION INTRAMUSCULAR at 10:05

## 2019-02-26 RX ADMIN — OXYCODONE AND ACETAMINOPHEN 1 TABLET: 5; 325 TABLET ORAL at 11:20

## 2019-02-26 RX ADMIN — ATROPA BELLADONNA AND OPIUM 30 MG: 16.2; 3 SUPPOSITORY RECTAL at 10:05

## 2019-02-26 RX ADMIN — MIDAZOLAM HYDROCHLORIDE 2 MG: 1 INJECTION, SOLUTION INTRAMUSCULAR; INTRAVENOUS at 09:14

## 2019-02-26 RX ADMIN — PROPOFOL 200 MG: 10 INJECTION, EMULSION INTRAVENOUS at 09:18

## 2019-02-26 RX ADMIN — LIDOCAINE HYDROCHLORIDE 80 MG: 20 INJECTION, SOLUTION EPIDURAL; INFILTRATION; INTRACAUDAL; PERINEURAL at 09:15

## 2019-02-26 RX ADMIN — MEPERIDINE HYDROCHLORIDE 25 MG: 25 INJECTION INTRAMUSCULAR; INTRAVENOUS; SUBCUTANEOUS at 10:05

## 2019-02-26 RX ADMIN — OXYCODONE HYDROCHLORIDE AND ACETAMINOPHEN 1 TABLET: 5; 325 TABLET ORAL at 11:20

## 2019-02-26 RX ADMIN — DEXAMETHASONE SODIUM PHOSPHATE 8 MG: 4 INJECTION, SOLUTION INTRAMUSCULAR; INTRAVENOUS at 09:29

## 2019-02-26 RX ADMIN — CIPROFLOXACIN 400 MG: 2 INJECTION, SOLUTION INTRAVENOUS at 09:21

## 2019-02-26 RX ADMIN — LABETALOL 20 MG/4 ML (5 MG/ML) INTRAVENOUS SYRINGE 5 MG: at 10:05

## 2019-02-26 RX ADMIN — SODIUM CHLORIDE: 9 INJECTION, SOLUTION INTRAVENOUS at 08:12

## 2019-02-26 ASSESSMENT — PULMONARY FUNCTION TESTS
PIF_VALUE: 10
PIF_VALUE: 11
PIF_VALUE: 9
PIF_VALUE: 9
PIF_VALUE: 11
PIF_VALUE: 11
PIF_VALUE: 9
PIF_VALUE: 9
PIF_VALUE: 3
PIF_VALUE: 10
PIF_VALUE: 10
PIF_VALUE: 11
PIF_VALUE: 10
PIF_VALUE: 9
PIF_VALUE: 9
PIF_VALUE: 10
PIF_VALUE: 11
PIF_VALUE: 9
PIF_VALUE: 3
PIF_VALUE: 1
PIF_VALUE: 9
PIF_VALUE: 0
PIF_VALUE: 0
PIF_VALUE: 10
PIF_VALUE: 10
PIF_VALUE: 9
PIF_VALUE: 0
PIF_VALUE: 10
PIF_VALUE: 10
PIF_VALUE: 9
PIF_VALUE: 11
PIF_VALUE: 10
PIF_VALUE: 0
PIF_VALUE: 11
PIF_VALUE: 10
PIF_VALUE: 9
PIF_VALUE: 10
PIF_VALUE: 10
PIF_VALUE: 11
PIF_VALUE: 10

## 2019-02-26 ASSESSMENT — PAIN DESCRIPTION - LOCATION
LOCATION: ABDOMEN

## 2019-02-26 ASSESSMENT — PAIN DESCRIPTION - DESCRIPTORS
DESCRIPTORS: ACHING

## 2019-02-26 ASSESSMENT — PAIN DESCRIPTION - ORIENTATION
ORIENTATION: RIGHT;LOWER
ORIENTATION: LOWER

## 2019-02-26 ASSESSMENT — PAIN DESCRIPTION - PROGRESSION: CLINICAL_PROGRESSION: GRADUALLY IMPROVING

## 2019-02-26 ASSESSMENT — PAIN DESCRIPTION - PAIN TYPE
TYPE: SURGICAL PAIN

## 2019-02-26 ASSESSMENT — PAIN SCALES - GENERAL
PAINLEVEL_OUTOF10: 0
PAINLEVEL_OUTOF10: 5
PAINLEVEL_OUTOF10: 4
PAINLEVEL_OUTOF10: 3
PAINLEVEL_OUTOF10: 5
PAINLEVEL_OUTOF10: 4

## 2019-02-28 LAB — URINE CULTURE REFLEX: NORMAL

## 2019-03-18 ENCOUNTER — PROCEDURE VISIT (OUTPATIENT)
Dept: UROLOGY | Age: 44
End: 2019-03-18
Payer: COMMERCIAL

## 2019-03-18 ENCOUNTER — TELEPHONE (OUTPATIENT)
Dept: UROLOGY | Age: 44
End: 2019-03-18

## 2019-03-18 ENCOUNTER — HOSPITAL ENCOUNTER (OUTPATIENT)
Age: 44
Discharge: HOME | End: 2019-03-18
Payer: COMMERCIAL

## 2019-03-18 VITALS
DIASTOLIC BLOOD PRESSURE: 80 MMHG | WEIGHT: 186 LBS | HEIGHT: 64 IN | SYSTOLIC BLOOD PRESSURE: 112 MMHG | BODY MASS INDEX: 31.76 KG/M2

## 2019-03-18 DIAGNOSIS — N20.1 OBSTRUCTION OF RIGHT URETEROPELVIC JUNCTION (UPJ) DUE TO STONE: Primary | ICD-10-CM

## 2019-03-18 LAB
BILIRUBIN URINE: NEGATIVE
BLOOD URINE, POC: ABNORMAL
CHARACTER, URINE: CLEAR
CHOLESTEROL, TOTAL: 230 MG/DL (ref 100–199)
COLOR, URINE: YELLOW
GLUCOSE BLD-MCNC: 87 MG/DL (ref 70–108)
GLUCOSE URINE: NEGATIVE MG/DL
HDLC SERPL-MCNC: 44 MG/DL
KETONES, URINE: NEGATIVE
LDL CHOLESTEROL CALCULATED: 131 MG/DL
LEUKOCYTE CLUMPS, URINE: ABNORMAL
NITRITE, URINE: NEGATIVE
PH, URINE: 7 (ref 5–9)
PROTEIN, URINE: ABNORMAL MG/DL
SPECIFIC GRAVITY, URINE: 1.01 (ref 1–1.03)
T3 TOTAL: 130 NG/DL (ref 72–181)
T4 FREE: 0.84 NG/DL (ref 0.93–1.76)
TRIGL SERPL-MCNC: 277 MG/DL (ref 0–199)
TSH SERPL DL<=0.05 MIU/L-ACNC: 0.75 UIU/ML (ref 0.4–4.2)
UROBILINOGEN, URINE: 0.2 EU/DL (ref 0–1)

## 2019-03-18 PROCEDURE — 80061 LIPID PANEL: CPT

## 2019-03-18 PROCEDURE — 52310 CYSTOSCOPY AND TREATMENT: CPT | Performed by: UROLOGY

## 2019-03-18 PROCEDURE — 84443 ASSAY THYROID STIM HORMONE: CPT

## 2019-03-18 PROCEDURE — 99999 PR OFFICE/OUTPT VISIT,PROCEDURE ONLY: CPT | Performed by: UROLOGY

## 2019-03-18 PROCEDURE — 82947 ASSAY GLUCOSE BLOOD QUANT: CPT

## 2019-03-18 PROCEDURE — 84439 ASSAY OF FREE THYROXINE: CPT

## 2019-03-18 PROCEDURE — 84482 T3 REVERSE: CPT

## 2019-03-18 PROCEDURE — 84480 ASSAY TRIIODOTHYRONINE (T3): CPT

## 2019-03-18 PROCEDURE — 36415 COLL VENOUS BLD VENIPUNCTURE: CPT

## 2019-03-18 PROCEDURE — 84481 FREE ASSAY (FT-3): CPT

## 2019-03-18 PROCEDURE — 84436 ASSAY OF TOTAL THYROXINE: CPT

## 2019-03-19 LAB
T3 FREE: 3.4 PG/ML (ref 2.02–4.43)
THYROXINE (T4): 5.1 UG/DL (ref 4.5–12)

## 2019-03-23 LAB — T3 REVERSE: 11.4 NG/DL (ref 9–27)

## 2019-04-09 ENCOUNTER — TELEPHONE (OUTPATIENT)
Dept: UROLOGY | Age: 44
End: 2019-04-09

## 2019-04-09 NOTE — TELEPHONE ENCOUNTER
Please call patient and let her know that the 24 hour urine study is back. The urine output was VERY low and she needs to increase her water intake to 3-4 liters of water daily for stone prevention. This alone can cause kidney stones. She needs to more than double her water intake for stone prevention. She had less than a liter of urine out all day and we suggest at least 2.5 liters out for stone prevention. Urine calcium is borderline elevated. We suggest 800-1200 mg calcium daily. Excess sodium can also cause excess calcium. Start a low sodium diet as well. Food Serving Size Sodium mg   Vegetables and Fruit Fresh and most frozen vegetables contain very little sodium. Peppers (jalapeno, hot chilli), canned/bottled 30 mL (2 Tbsp) 843-1201   Tomato sauce (plain or with vegetables), canned/bottled 125 mL (1/2 cup) 585-721   Sauerkraut, canned/bottled 125 mL (1/2 cup) 496   Pickles (sour, dill) 1 small 299-447   Vegetables, all varieties, canned 125 mL (1/2 cup) 245-439   Tomato juice and vegetable cocktail 125 mL (1/2 cup) 226-325   Stewed tomatoes, canned 125 mL (1/2 cup) 298   Olives, canned 4 olives 463   Pizza sauce 125 mL (1/2 cup) 246   Grain Products Grains such as rice, barley, quinoa, oats and wheat are low in sodium.    Cereal     Cream of wheat, all types, cooked 175 mL (3/4 cup) 370   Breakfast cereal, dry, all varieties 30 g    Oatmeal, instant, cooked 175 mL (3/4 cup) 216-240   Other Grain Products     Crackers, all varieties, salted 30 g 192-282   Bread roll (rye, [place]) 1 roll (35 g) 234-258   Bread, all types 1 slice (35 g) 051-551   Muffin (carrot, blueberry, chocolate chip) 1 small (66 g) 203-232   Bagel, all varieties ½ bagel (45 g) 222-310   Milk and Alternatives   Buttermilk 250 mL (1 cup) 272-492   Cheese     Cottage cheese (1%, 2%) 250 mL (1 cup) 736-970   Blue 50 g (1 ½ oz) 573-904   Processed cheese slices (cheddar, Swiss) 50 g (1 ½ oz) 711-960   Feta 50 g (1 ½ oz) 458 Cheese spread 30 mL (2 Tbsp) 633   Cheddar, janette, edam, gouda, mozzarella, provolone, camembert 50 g (1 ½ oz) 302-438   Cottage cheese, fat free 250 mL (1 cup) 570   Meat and Alternatives Fresh and unprocessed frozen meat, poultry and fish contain very little sodium.  Bagged dried peas, beans and lentils contain little sodium   Meat     Reilly, cooked 75 g (2 ½ oz) 0228-4415   Olmitz Laser (back reilly/peameal, English style reilly), cooked 75 g (2 ½ oz) 1152   Ham, cured, cooked 75 g (2 ½ oz) 888-3157   Beef jerky 75 g (2 ½ oz) 976   Corned beef, canned 75 g (2 ½ oz) 668   Ham, reduced sodium, cooked 75 g (2 ½ oz) 727   Poultry     [place] reilly 75 g (2 ½ oz) 1714   [place] smoked 75 g (2 ½ oz) 747   Chicken/turkey, rotisserie/ready to serve, barbequed 75 g (2 ½ oz) 253-628   Chicken/turkey, canned 75 g (2 ½ oz) 350-540   Chicken nuggets or burger, cooked 75 g (2 ½ oz) 334-418   Meat Products     Salami or pepperoni, all varieties 75 g (2 ½ oz) [phone]   Ham or chicken, canned 75 g (2 ½ oz) 959-2919   Luncheon/deli meat, all varieties 75 g (2 ½ oz)/ 3 slices 862-303   Wiener, frankfurter, all varieties, cooked 75 g (2 ½ oz) 528-971   Chorizo (beef, pork) 75 g (2 ½ oz) 542   Sausage, all varieties, cooked 75 g (2 ½ oz) 229-197   Luncheon/deli meat (pork, chicken), reduced sodium 75 g (2 ½ oz) 781   Salami or bologna, all varieties, reduced sodium 75 g (2 ½ oz) 446-702   Ham, honey, cooked 75 g (2 ½ oz) 675   [place] 75 g (2 ½ oz) 525-645   [place], canned 75 g (2 ½ oz) 290-605   Blood sausage/blood pudding, cooked 75 g (2 ½ oz) 510   Sausage, all varieties, reduced sodium, cooked 75 g (2 ½ oz) 441   Wiener, frankfurter, all varieties, reduced sodium, cooked   75 g (2 ½ oz) 233   Fish and Seafood     Mackerel or cod, salted 75 g (2 ½ oz) 8160-2647   Anchovies, canned 75 g (2 ½ oz) 2756   Fish, all varieties, smoked 75 g (2 ½ oz) 502-771   Herring, pickled or kippered 75 g (2 ½ oz) 709-678   Shellfish (crab, shrimp, calamari, 2015.  [email or web address]  [Accessed August 25, 2016].       Foods and drinks with calcium   Food Calcium, milligrams   Milk (skim, 2 percent, or whole, 8 oz [240 mL]) 300   Yogurt (6 oz [168 g]) 250   Orange juice (with calcium, 8 oz [240 mL]) 300   Tofu with calcium (1/2 cup [713 g]) 435   Cheese (1 oz [28 g]) 195 to 335 (hard cheese = higher calcium)   Cottage cheese (1/2 cup [251 g]) 130   Ice cream or frozen yogurt (1/2 cup [127 g]) 100   Soy milk (8 oz [240 mL]) 300   Beans (1/2 cup cooked [255 g]) 60 to 80   Dark, leafy green vegetables (1/2 cup cooked [296 g]) 50 to 135   Almonds (24 whole) 70   Orange (1 medium) 60

## 2019-04-10 NOTE — TELEPHONE ENCOUNTER
I called the patient and advised her of the [facility] results and recommendations per Toyin Palma CNP. She voiced understanding. I sent a copy of the results and recommendations via mail.

## 2019-05-22 LAB
CREAT SERPL-MCNC: 0.7 MG/DL (ref 0.6–1.3)
CREATININE CLEARANCE: >60

## 2019-06-03 ENCOUNTER — HOSPITAL ENCOUNTER (OUTPATIENT)
Dept: NUCLEAR MEDICINE | Age: 44
Discharge: HOME | End: 2019-06-03
Payer: COMMERCIAL

## 2019-06-03 DIAGNOSIS — N20.1 OBSTRUCTION OF RIGHT URETEROPELVIC JUNCTION (UPJ) DUE TO STONE: ICD-10-CM

## 2019-06-03 PROCEDURE — 6360000002 HC RX W HCPCS

## 2019-06-03 PROCEDURE — 78708 K FLOW/FUNCT IMAGE W/DRUG: CPT

## 2019-06-03 PROCEDURE — A9562 TC99M MERTIATIDE: HCPCS | Performed by: UROLOGY

## 2019-06-03 PROCEDURE — 3430000000 HC RX DIAGNOSTIC RADIOPHARMACEUTICAL: Performed by: UROLOGY

## 2019-06-03 PROCEDURE — 2709999900 HC NON-CHARGEABLE SUPPLY

## 2019-06-03 RX ORDER — FUROSEMIDE 10 MG/ML
40 INJECTION INTRAMUSCULAR; INTRAVENOUS ONCE
Status: COMPLETED | OUTPATIENT
Start: 2019-06-03 | End: 2019-06-03

## 2019-06-03 RX ADMIN — FUROSEMIDE 40 MG: 10 INJECTION INTRAMUSCULAR; INTRAVENOUS at 13:54

## 2019-06-03 RX ADMIN — Medication 9.7 MILLICURIE: at 13:43

## 2019-06-19 ENCOUNTER — TELEPHONE (OUTPATIENT)
Dept: UROLOGY | Age: 44
End: 2019-06-19

## 2019-06-19 DIAGNOSIS — N20.1 OBSTRUCTION OF RIGHT URETEROPELVIC JUNCTION (UPJ) DUE TO STONE: Primary | ICD-10-CM

## 2019-06-19 NOTE — TELEPHONE ENCOUNTER
The patient was unable to make her appointment scheduled with Winsome Mauro CNP today. She would like her renal lasix scan results given over the phone. Please advise. Thank you.

## 2019-06-19 NOTE — TELEPHONE ENCOUNTER
Her renal lasix scan did not show any high grade obstruction which is good. Is she having any symptoms such as flank pain, or recurrent UTIs? Would like to repeat Renal lasix scan in 1 year to make sure nothing has changed.

## 2019-06-19 NOTE — TELEPHONE ENCOUNTER
Patient notified and voiced understanding. Transferred to [facility] who will set up the renal scan in one year. She did say that she does have pressure off and on in the flank area but she said that this is nothing new it happens about once a month.      Thank you

## 2019-12-23 ENCOUNTER — HOSPITAL ENCOUNTER (OUTPATIENT)
Dept: WOMENS IMAGING | Age: 44
Discharge: HOME | End: 2019-12-23
Payer: COMMERCIAL

## 2019-12-23 DIAGNOSIS — Z12.31 VISIT FOR SCREENING MAMMOGRAM: ICD-10-CM

## 2019-12-23 PROCEDURE — 77063 BREAST TOMOSYNTHESIS BI: CPT

## 2020-01-02 ENCOUNTER — HOSPITAL ENCOUNTER (OUTPATIENT)
Dept: WOMENS IMAGING | Age: 45
Discharge: HOME | End: 2020-01-02
Payer: COMMERCIAL

## 2020-01-02 PROCEDURE — 76642 ULTRASOUND BREAST LIMITED: CPT

## 2020-01-02 PROCEDURE — G0279 TOMOSYNTHESIS, MAMMO: HCPCS

## 2020-01-21 ENCOUNTER — HOSPITAL ENCOUNTER (OUTPATIENT)
Dept: WOMENS IMAGING | Age: 45
Discharge: HOME | End: 2020-01-21
Payer: COMMERCIAL

## 2020-01-21 PROCEDURE — 88305 TISSUE EXAM BY PATHOLOGIST: CPT

## 2020-01-21 PROCEDURE — 2709999900 HC NON-CHARGEABLE SUPPLY

## 2020-01-21 PROCEDURE — C1894 INTRO/SHEATH, NON-LASER: HCPCS

## 2020-01-21 PROCEDURE — G0279 TOMOSYNTHESIS, MAMMO: HCPCS

## 2020-01-21 PROCEDURE — A4648 IMPLANTABLE TISSUE MARKER: HCPCS

## 2020-01-21 PROCEDURE — 88342 IMHCHEM/IMCYTCHM 1ST ANTB: CPT

## 2020-01-21 PROCEDURE — 19083 BX BREAST 1ST LESION US IMAG: CPT

## 2020-01-21 NOTE — PROGRESS NOTES
Breast Biopsy Flowsheet/Post-Operative Care    Date of Procedure: 1/21/2020  Physician: Dr. Kacy Reid  Technologist: Mino Baltazar guided breast biopsy  Lesion type: Non-palpable  Breast: right    Clock face position: Site #1: upper outer, middle depth       Primary Method of Detection: Mammogram      Microcalcification's: no   Distribution: N/A    Asymmetry: asymmetric    Biopsy Method:   Sertera:    Site # 1    Gauge: 14    # of Passes: 5     Clip: Tribell    Pre-Op Assessment: (BI-RADS)   4. Suspicious Abnormality    Patient Tolerated Procedure: good  Complications: moderate bloody oozing with first core and continued during biopsy. Firm pressure held and ice applied. Stopped completely after 12 minutes. Denies and meds that thin blood. Vessel on edge of biopsy site per Dr. Kacy Reid.   Comments: n/a    Post Operative Care  Steri strips: Yes  Dressing: Gauze, Tape   Ice Applied to Site:  Yes  Evidence of Bleeding:  No    Pain Verbalized: No      Written Discharge Instructions: Yes  Condition at Discharge: good  Time of Discharge: 1130    Electronically signed by Dorinda Mandel RN on 1/21/2020 at 11:33 AM

## 2020-01-21 NOTE — PROGRESS NOTES
Formulation and discussion of sedation / procedure plans, risks, benefits, side effects and alternatives with patient and/or responsible adult completed.     Electronically signed by Taz Sarmiento MD on 1/21/2020 at 10:56 AM

## 2020-05-23 ENCOUNTER — TELEPHONE (OUTPATIENT)
Dept: FAMILY MEDICINE CLINIC | Age: 45
End: 2020-05-23

## 2021-01-12 ENCOUNTER — HOSPITAL ENCOUNTER (OUTPATIENT)
Dept: WOMENS IMAGING | Age: 46
Discharge: HOME | End: 2021-01-12
Payer: COMMERCIAL

## 2021-01-12 DIAGNOSIS — Z12.31 VISIT FOR SCREENING MAMMOGRAM: ICD-10-CM

## 2021-01-12 PROCEDURE — 77063 BREAST TOMOSYNTHESIS BI: CPT

## 2022-01-20 ENCOUNTER — HOSPITAL ENCOUNTER (OUTPATIENT)
Dept: WOMENS IMAGING | Age: 47
Discharge: HOME | End: 2022-01-20
Payer: COMMERCIAL

## 2022-01-20 ENCOUNTER — OFFICE VISIT (OUTPATIENT)
Dept: CARDIOLOGY CLINIC | Age: 47
End: 2022-01-20
Payer: COMMERCIAL

## 2022-01-20 VITALS
HEART RATE: 95 BPM | SYSTOLIC BLOOD PRESSURE: 132 MMHG | WEIGHT: 200 LBS | HEIGHT: 64 IN | BODY MASS INDEX: 34.15 KG/M2 | DIASTOLIC BLOOD PRESSURE: 76 MMHG

## 2022-01-20 DIAGNOSIS — Z12.31 VISIT FOR SCREENING MAMMOGRAM: ICD-10-CM

## 2022-01-20 DIAGNOSIS — R06.02 SOB (SHORTNESS OF BREATH): Primary | ICD-10-CM

## 2022-01-20 PROCEDURE — 99204 OFFICE O/P NEW MOD 45 MIN: CPT | Performed by: INTERNAL MEDICINE

## 2022-01-20 PROCEDURE — 77063 BREAST TOMOSYNTHESIS BI: CPT

## 2022-01-20 PROCEDURE — 93000 ELECTROCARDIOGRAM COMPLETE: CPT | Performed by: INTERNAL MEDICINE

## 2022-01-20 RX ORDER — AMLODIPINE BESYLATE 2.5 MG/1
TABLET ORAL
COMMUNITY
Start: 2022-01-06 | End: 2022-01-20

## 2022-01-20 RX ORDER — LOSARTAN POTASSIUM 100 MG/1
TABLET ORAL
COMMUNITY
Start: 2021-11-20

## 2022-01-20 RX ORDER — CHLORAL HYDRATE 500 MG
3000 CAPSULE ORAL 3 TIMES DAILY
COMMUNITY

## 2022-01-20 RX ORDER — AMLODIPINE BESYLATE 5 MG/1
5 TABLET ORAL DAILY
Qty: 30 TABLET | Refills: 5 | Status: SHIPPED | OUTPATIENT
Start: 2022-01-20

## 2022-01-20 RX ORDER — ASCORBIC ACID 500 MG
500 TABLET ORAL DAILY
COMMUNITY

## 2022-01-20 RX ORDER — MULTIVITAMIN
1 CAPSULE ORAL DAILY
COMMUNITY

## 2022-01-20 RX ORDER — CETIRIZINE HYDROCHLORIDE 10 MG/1
10 TABLET ORAL DAILY
COMMUNITY

## 2022-01-20 RX ORDER — ROSUVASTATIN CALCIUM 5 MG/1
TABLET, COATED ORAL
COMMUNITY
Start: 2021-10-26

## 2022-01-20 NOTE — PROGRESS NOTES
[id number] [facility] [place]  [place] [address]  Dept: [phone]  Dept Fax: [phone]  Loc: [phone]    Visit Date: 1/20/2022    Ms. Surjit Peguero is a 49 y.o. female  who presented for:  Chief Complaint   Patient presents with    New Patient       HPI:   48 yo F c hx of HTN, PCOS, Anxiety is here for hypertension. Patient has multiple family members who follow up in cardiology clinic. She followed up with cardiologist at [facility]. She was placed on amlodipine/losartan. She thinks she snores regularly.           Current Outpatient Medications:     zinc 50 MG CAPS, Take by mouth, Disp: , Rfl:     Omega-3 Fatty Acids (FISH OIL) 1000 MG CAPS, Take 3,000 mg by mouth 3 times daily, Disp: , Rfl:     Multiple Vitamin (MULTIVITAMIN) capsule, Take 1 capsule by mouth daily, Disp: , Rfl:     rosuvastatin (CRESTOR) 5 MG tablet, TAKE ONE TABLET BY MOUTH ONE TIME DAILY TO HELP LOWER CHOLESTEROL., Disp: , Rfl:     losartan (COZAAR) 100 MG tablet, TAKE ONE TABLET EACH DAY TO HELP CONTROL BLOOD PRESSURE, Disp: , Rfl:     cetirizine (ZYRTEC) 10 MG tablet, Take 10 mg by mouth daily, Disp: , Rfl:     amLODIPine (NORVASC) 2.5 MG tablet, TAKE ONE TABLET EACH DAY TO HELP CONTROL BLOOD PRESSURE, Disp: , Rfl:     Cholecalciferol (VITAMIN D3) 125 MCG (5000 UT) TABS, Take by mouth, Disp: , Rfl:     vitamin C (ASCORBIC ACID) 500 MG tablet, Take 500 mg by mouth daily, Disp: , Rfl:     NONFORMULARY, orthobiotic, Disp: , Rfl:     thyroid (ARMOUR) 30 MG tablet, Take 30 mg by mouth daily, Disp: , Rfl:     famotidine (PEPCID) 20 MG tablet, Take 40 mg by mouth daily as needed , Disp: , Rfl:     Azelastine-Fluticasone (DYMISTA NA), 1 spray by Nasal route daily Indications: EACH NARE, Disp: , Rfl:     budesonide-formoterol (SYMBICORT) 160-4.5 MCG/ACT AERO, Inhale 2 puffs into the lungs 2 times daily, Disp: , Rfl:     PROGESTERONE, VAGINAL, 8 % GEL, dysuria, enuresis, flank pain and hematuria. Musculoskeletal: denies arthralgias, joint swelling and neck pain. Neurological: denies numbness and headaches. Psychiatric/Behavioral: denies agitation, confusion, decreased concentration and dysphoric mood    All others reviewed and are negative. Objective:     BP (!) 154/82   Pulse 95   Ht 5' 3.5\" (1.613 m)   Wt 200 lb (90.7 kg)   BMI 34.87 kg/m²     Wt Readings from Last 3 Encounters:   01/20/22 200 lb (90.7 kg)   03/18/19 186 lb (84.4 kg)   02/26/19 182 lb 8.7 oz (82.8 kg)     BP Readings from Last 3 Encounters:   01/20/22 (!) 154/82   03/18/19 112/80   02/26/19 (!) 155/84       PHYSICAL EXAM  Constitutional: Oriented to person, place, and time. Appears well-developed and well-nourished. HENT:   Head: Normocephalic and atraumatic. Eyes: EOM are normal. Pupils are equal, round, and reactive to light. Neck: Normal range of motion. Neck supple. No JVD present. Cardiovascular: Normal rate , normal heart sounds and intact distal pulses. Pulmonary/Chest: Effort normal and breath sounds normal. No respiratory distress. No wheezes. No rales. Abdominal: Soft. Bowel sounds are normal. No distension. There is no tenderness. Musculoskeletal: Normal range of motion. No edema. Neurological: Alert and oriented to person, place, and time. No cranial nerve deficit. Coordination normal.   Skin: Skin is warm and dry. Psychiatric: Normal mood and affect.        Lab Results   Component Value Date    CKTOTAL 58 11/14/2013    CKTOTAL 59 11/14/2013       Lab Results   Component Value Date    WBC 10.4 02/05/2019    RBC 4.73 02/05/2019    HGB 12.5 02/26/2019    HCT 39.4 02/26/2019    MCV 89.0 02/05/2019    MCH 28.5 02/05/2019    MCHC 32.1 02/05/2019    RDW 13.8 12/23/2016     02/05/2019    MPV 10.0 02/05/2019       Lab Results   Component Value Date     02/05/2019    K 4.2 02/05/2019     02/05/2019    CO2 21 02/05/2019    BUN 14 02/05/2019 LABALBU 4.4 02/05/2019    CREATININE 0.70 05/22/2019    CALCIUM 9.4 02/05/2019    LABGLOM >90 02/05/2019    GLUCOSE 87 03/18/2019       Lab Results   Component Value Date    ALKPHOS 55 02/05/2019    ALT 21 02/05/2019    AST 15 02/05/2019    PROT 7.1 02/05/2019    BILITOT 0.2 02/05/2019    BILITOT NEGATIVE 10/07/2015    BILIDIR <0.2 02/05/2019    LABALBU 4.4 02/05/2019       No results found for: MG    No results found for: INR, PROTIME      Lab Results   Component Value Date    LABA1C 5.4 12/30/2016       Lab Results   Component Value Date    TRIG 277 03/18/2019    HDL 44 03/18/2019    LDLCALC 131 03/18/2019       Lab Results   Component Value Date    TSH 0.751 03/18/2019         Testing Reviewed:      I haveindividually reviewed the below cardiac tests    EKG:    ECHO: No results found for this or any previous visit. STRESS:    CATH:    Assessment/Plan       Diagnosis Orders   1. SOB (shortness of breath)  EKG 12 Lead     Uncontrolled HTN  Anxiety  ? PING    Needs sleep study  Will increase amlodipine 5mg daily, likely need 10mg daily  Continue losartan  Continue crestor  Reviewed lipids  The patient is asked to make an attempt to improve diet and exercise patterns to aid in medical management of this problem. Advised more plant based nutrition/meditarrean diet   Advised patient to call office or seek immediate medical attention if there is any new onset of  any chest pain, sob, palpitations, lightheadedness, dizziness, orthopnea, PND or pedal edema. All medication side effects were discussed in details. Thank youfor allowing me to participate in the care of this patient. Please do not hesitate to contact me for any further questions. Return in about 3 months (around 4/20/2022), or if symptoms worsen or fail to improve, for Review testing, Regular follow up.        Electronically signed by Alexx Guevara MD [facility]  1/20/2022 at 12:31 PM EST

## 2022-03-02 NOTE — PROGRESS NOTES
[facility] [address] initial consultation note    Red Opitz                                                Chief complaint: Red Opitz is a 49 y. o.oldfemale came for further evaluation regarding her hypersomnia and ?sleep apnea  with referral from Dr. Leeanna Huffman MD    [facility]:    Sleep/Wake schedule:  Usual time to go to bed during the work/regular day of week: 8:30 PM to 9 PM  Usual time to wake up during the work//regular day of week: 5:30 AM  Over the weekends her sleep schedule:   [x]phase delayed. She feels the same on the weekends despite sleeping long time. She usually falls a sleep in less than: Approximately 15 to 30 minutes. She takes naps: Yes. Number of naps per week: She usually take 1 nap per week. In summer months/in her holidays she takes more naps. During each nap she spends a total of: 2 to 3 hours  The naps were reported as refreshing: No      Sleep Hygiene:  Is the temperature and evironment in her bed room is acceptable to her: Yes. She watches Television in her bed room: No.   She read books, study, pay bills etc in the bed: Yes. She works with her smart phone before going to sleep. She reads paper books before going to sleep sometimes. Frequency She wake up during night/sleep: 5-15 times  Majority of nocturnal awakenings are for urination: No.   Difficulty in falling back to sleep after nocturnal awakenings: Sometimes  . Do you drink coffee: Yes. She drinks 1 cup of coffee in the morning during the weekdays. She drinks up to 2 cups of coffee on the weekends. Do you drink caffeinated beverages i.e sodas: Yes. She drinks 1 can of caffeinated soda per day on average.   She drinks a Pepsi  Do you drink tea: No.     Do you drink alcoholic beverages: No.    History of recreational drug use: No    Social History     Tobacco Use    Smoking status: Never Smoker    Smokeless tobacco: Never Used   Vaping Use  Vaping Use: Never used   Substance Use Topics    Alcohol use: Yes     Alcohol/week: 0.0 standard drinks     Comment: occasional    Drug use: No       Sleep apnea symptoms:  Noticed to have loud snoring:Yes. Noted by her family member- spouse  Witnessed apneas during sleep noticed: Yes. Noted by her family member- spouse  History of choking and gasping sensation at night time: Yes. History of headaches in the morning:No.   History of dry mouth in the morning: Yes. History of palpitations during night time/nocturnal awakenings: Yes. History of sweating during night time/nocturnal awakenings: Yes. General:  History of head injury in the past: Yes. She fell out of a shopping cart at the age of 5 months leading to skull fracture. She was hospitalized at that time. History of seizures: No  Rest less legs syndrome symptoms:NO  History suggestive of periodic limb movements during sleep: NO  History suggestive of hypnagogic hallucinations: NO  History suggestive of hypnopompic hallucinations: NO  History suggestive of sleep talking:NO  History suggestive of sleep walking:NO  History suggestive of bruxism:Yes  [x] No mouth guard. She gives a history of bruxism on rare occasions. History suggestive of cataplexy: NO  History suggestive of sleep paralysis: NO    Family history of sleep disorders:  Family history of obstructive sleep apnea: Yes. Her father was diagnosed with obstructive sleep apnea. Her father was prescribed with a CPAP device. Her father is currently not using CPAP device with good compliance. Family history of Narcolepsy: No.   Family history of Rest less legs syndrome : No.       History regarding old sleep studies:  Prior history of sleep study: No.  Using CPAP device: No.  Currently using home Oxygen: NO.       Patient considerations:  Is the patient is ambulatory: Yes  Patient is currently using: None of these Wheelchair, Oleg Navneet or Pinkie Mower.   Para/Quadriplegic: NO  Hearing deficit : NO  Claustrophobic: NO  MDD : NO  Blind: NO  Incontinent: NO  Para/Quadraplegi: NO.   Need transportation to and from Sleep Center:NO      Social History:  Social History     Tobacco Use    Smoking status: Never Smoker    Smokeless tobacco: Never Used   Vaping Use    Vaping Use: Never used   Substance Use Topics    Alcohol use: Yes     Alcohol/week: 0.0 standard drinks     Comment: occasional    Drug use: No   .  She is currently working: Yes. She is currently working as a teacher during the daytime. She works at [employer].   She teaches fourth grade mathematics  She usually goes to her work at: 7:15 AM  She completes her work at: 5:30 PM to 7:30 PM.  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                         Past Medical History:   Diagnosis Date    Acid reflux     Anxiety     Bronchitis     Hypertension     Kidney stones     PCOS (polycystic ovarian syndrome) 2005    Reactive airway disease 1999    Walking pneumonia        Past Surgical History:   Procedure Laterality Date    CYSTO/URETERO/PYELOSCOPY, CALCULUS TX N/A 2/5/2019    CYSTOSCOPY, RIGHT UTEROSCOPY, DILATION OF RIGHT UPJ  AND PROXIMAL URETER, BASKET RETRIEVAL OF STONES, RIGHT URETERAL STENT INSERTION performed by Lg Guzman MD at [address] [address] N/A 2/26/2019    CYSTOSCOPY, RIGHT URETEROSCOPY,  BALLOON DILATION AND PLACEMENT OF RIGHT URETERAL STENT performed by Lg Guzman MD at [address] Left 12/30/2016    Stent Insertion    ENDOMETRIAL ABLATION     [address]      LAPAROSCOPY  2009, 2011    x2    YOSEF US GUID NDL BIOPSY RIGHT Right 01/21/2020    benign    YOSEF US GUID NDL BIOPSY RIGHT Right 04/16/2018    benign    NASAL SINUS SURGERY  01/05/2016    TUBAL LIGATION      UPPER GASTROINTESTINAL ENDOSCOPY      [facility]    WISDOM TOOTH EXTRACTION  1996       No Known Allergies    Current Outpatient Medications   Medication Sig Dispense Refill    Budeson-Glycopyrrol-Formoterol (BREZTRI AEROSPHERE) 160-9-4.8 MCG/ACT AERO Inhale into the lungs      zinc 50 MG CAPS Take by mouth      Omega-3 Fatty Acids (FISH OIL) 1000 MG CAPS Take 3,000 mg by mouth 3 times daily      Multiple Vitamin (MULTIVITAMIN) capsule Take 1 capsule by mouth daily      rosuvastatin (CRESTOR) 5 MG tablet TAKE ONE TABLET BY MOUTH ONE TIME DAILY TO HELP LOWER CHOLESTEROL.  losartan (COZAAR) 100 MG tablet TAKE ONE TABLET EACH DAY TO HELP CONTROL BLOOD PRESSURE      cetirizine (ZYRTEC) 10 MG tablet Take 10 mg by mouth daily      Cholecalciferol (VITAMIN D3) 125 MCG (5000 UT) TABS Take by mouth      vitamin C (ASCORBIC ACID) 500 MG tablet Take 500 mg by mouth daily      NONFORMULARY orthobiotic      amLODIPine (NORVASC) 5 MG tablet Take 1 tablet by mouth daily 30 tablet 5    thyroid (ARMOUR) 30 MG tablet Take 30 mg by mouth daily      famotidine (PEPCID) 20 MG tablet Take 40 mg by mouth daily as needed       Azelastine-Fluticasone (DYMISTA NA) 1 spray by Nasal route daily Indications: EACH NARE      PROGESTERONE, VAGINAL, 8 % GEL Place vaginally daily       SERTRALINE HCL PO Take 100 mg by mouth daily. No current facility-administered medications for this visit. Family History   Problem Relation Age of Onset    Hearing Loss Father     Heart Disease Father     High Blood Pressure Father     High Cholesterol Father     Thyroid Disease Father     Uterine Cancer Maternal Grandmother 54    Breast Cancer Neg Hx     Colon Cancer Neg Hx         Review of Systems:   General/Constitutional: She gained approximately 53 pounds of weight in the last 8 years with normal appetite. No fever or chills. HENT: Negative. Eyes: Negative. Upper respiratory tract: Frequent nasal stuffiness with post nasal drip. Lower respiratory tract/ lungs: Frequent cough with occasional sputum production. No hemoptysis.   She had Covid infection x2 in 2021.  Cardiovascular: No palpitations   Gastrointestinal: No nausea or vomiting. Neurological: No focal neurologiacal weakness. Extremities: No edema. Musculoskeletal: No complaints. Genitourinary: No complaints. Hematological: Negative. Psychiatric/Behavioral: Negative. Skin: No itching. /82 (Site: Left Upper Arm, Position: Sitting, Cuff Size: Medium Adult)   Pulse 79   Temp 97.8 °F (36.6 °C)   Ht 5' 3.5\" (1.613 m)   Wt 198 lb 3.2 oz (89.9 kg)   SpO2 98% Comment: room air at rest  BMI 34.56 kg/m²   BMI:  Body mass index is 34.56 kg/m². Mallampati airway Class:3  Neck Circumference:.16.5 Inches  [place] sleepiness score 3/30/22: 14  Sleep apnea quality of life questionnaire: 30    Physical Exam:   Nursing note and vitals reviewed. Constitutional: Patient appears moderately built and moderately nourished. No distress. Patient is oriented to person, place, and time. HENT:   Head: Normocephalic and atraumatic. Right Ear: External ear normal.   Left Ear: External ear normal.   Mouth/Throat: Oropharynx is clear and moist.  No oral thrush. Eyes: Conjunctivae are normal. Pupils are equal, round, and reactive to light. No scleral icterus. Neck: Neck supple. No JVD present. No tracheal deviation present. Cardiovascular: Normal rate, regular rhythm, normal heart sounds. No murmur heard. Pulmonary/Chest: Effort normal and breath sounds normal. No stridor. No respiratory distress. Occasional expiratory wheezes. No rales. Patient exhibits no tenderness. Abdominal: Soft. Patient exhibits no distension. No tenderness. Musculoskeletal: Normal range of motion. Extremities: Patient exhibits no edema and no tenderness. Lymphadenopathy:  No cervical adenopathy. Neurological: Patient is alert and oriented to person, place, and time. Skin: Skin is warm and dry. Patient is not diaphoretic. Psychiatric: Patient  has a normal mood and affect.  Patient behavior is normal.     Diagnostic Data:  None related sleep. Assessment:  -Snoring with witnessed apneas,frequent nocturnal awakenings and excessive daytime sleepiness to evaluate for obstructive sleep apnea. -Hypersomnia ( Excessive daytime sleepiness) may be due to obstructive sleep apnea Vs Inadequate sleep hygiene.  -Inadequate sleep hygiene.  -Essential hypertension on treatment with medications under control  -Anxiety disorder on treatment medications.  -Severe persistent bronchial asthma. She is currently following with Dr. Gloria Roach MD allergy and immunologist specialist [facility]. She is currently on treatment with Breztri Aerosphere 160/9/4.8mcg spray MDI, 2 puffs via inhalation BID along with albuterol HFA 2 puffs every 6 hourly as needed. -GERD on treatment with Pepcid.  -Allergic rhinitis on treatment with Dymista. She follows with Dr. Valentino Irwin physician. She underwent sinus surgery by Dr. Hyacinth Gresham MD  -PCOS (polycystic ovarian syndrome)      Recommendations/Plan:  -She was advised to continue Dymista nasal spray 1 spray into each nostril daily  -She was advised to continue her current follow-up with Dr. Gloria Roach MD regarding her bronchial asthma. She is going to receive pneumococcal vaccine today from his office.  -Follow CT scan of sinuses ordered by Dr. Gloria Roach MD to rule out sinusitis. - Schedule patient for nocturnal polysomnogram (Sleep study) with split night protocol at [facility] sleep lab to diagnose sleep apnea and to get optimal pressure I.e CPAP or BiPAP to start/continue PAP therapy.  Patient to follow with my clinic at [facility] sleep clinic in 6 to 8 weeks with CPAP download for further evaluation.  -I had a discussion with patient regarding avialable treatment options for her sleep disorder breathing including but not limited to CPAP titration in the sleep lab Vs.Dental appliance placement with referral to a local dentist Vs other available surgical options including Uvulopalatopharyngoplasty, maxillomandibular ostomy, Inspire device placement and tracheostomy as last option. At the end of discussion, she decided on CPAP/BiPAP/AutoSV as a treatment if she found to have obstructive sleep apnea during above test/study.  -She was educated to practice good sleep hygiene practices. She was provided with a good sleep hygiene hand out.  -Sanna Valdez was advised to make earlier appointment with my clinic if she develops any worsening of sleep symptoms. She verbalizes understanding.  -Sanna Valdez was advised to not to drive any motor vehicles or operate heavy equipment until her sleep symptoms are under good control. [address] verbalizes understanding.  -She was advised to loose weight by controlling diet and doing exercise once cleared by her family physician.   -She was advised to continue current positive airway pressure therapy with above described pressure.  -She advised to keep good compliance with current recommended pressure to get optimal results and clinical improvement.  -She was advised to call [employer] regarding supplies if needed.  -She was advised call my office for earlier appointment if needed for worsening of sleep symptoms.  -[address] educated about my impression and plan. Patient verbalizes understanding.      -I personally reviewed updated the Past medical hx, Past surgical hx,Social hx, Family hx, Medications, Allergies in the discrete data section of the patient chart along with labs, Pulmonary medicine,Sleep medicine related, Pathological, Microbiological and Radiological investigations.

## 2022-03-30 ENCOUNTER — INITIAL CONSULT (OUTPATIENT)
Dept: PULMONOLOGY | Age: 47
End: 2022-03-30
Payer: COMMERCIAL

## 2022-03-30 VITALS
DIASTOLIC BLOOD PRESSURE: 82 MMHG | HEART RATE: 79 BPM | TEMPERATURE: 97.8 F | SYSTOLIC BLOOD PRESSURE: 138 MMHG | OXYGEN SATURATION: 98 % | WEIGHT: 198.2 LBS | BODY MASS INDEX: 33.84 KG/M2 | HEIGHT: 64 IN

## 2022-03-30 DIAGNOSIS — I10 ESSENTIAL HYPERTENSION: ICD-10-CM

## 2022-03-30 DIAGNOSIS — G47.30 SLEEP APNEA, UNSPECIFIED TYPE: ICD-10-CM

## 2022-03-30 DIAGNOSIS — R06.83 SNORING: ICD-10-CM

## 2022-03-30 DIAGNOSIS — R06.81 APNEA: Primary | ICD-10-CM

## 2022-03-30 PROCEDURE — 99204 OFFICE O/P NEW MOD 45 MIN: CPT | Performed by: INTERNAL MEDICINE

## 2022-03-30 RX ORDER — BUDESONIDE, GLYCOPYRROLATE, AND FORMOTEROL FUMARATE 160; 9; 4.8 UG/1; UG/1; UG/1
AEROSOL, METERED RESPIRATORY (INHALATION)
COMMUNITY

## 2022-03-30 NOTE — PATIENT INSTRUCTIONS
Recommendations/Plan:  -She was advised to continue Dymista nasal spray 1 spray into each nostril daily  -She was advised to continue her current follow-up with Dr. Jatin Osei MD regarding her bronchial asthma. She is going to receive pneumococcal vaccine today from his office.  -Follow CT scan of sinuses ordered by Dr. Jatin Osei MD to rule out sinusitis. - Schedule patient for nocturnal polysomnogram (Sleep study) with split night protocol at [facility] sleep lab to diagnose sleep apnea and to get optimal pressure I.e CPAP or BiPAP to start/continue PAP therapy. Patient to follow with my clinic at [facility] sleep clinic in 6 to 8 weeks with CPAP download for further evaluation.  -I had a discussion with patient regarding avialable treatment options for her sleep disorder breathing including but not limited to CPAP titration in the sleep lab Vs.Dental appliance placement with referral to a local dentist Vs other available surgical options including Uvulopalatopharyngoplasty, maxillomandibular ostomy, Inspire device placement and tracheostomy as last option. At the end of discussion, she decided on CPAP/BiPAP/AutoSV as a treatment if she found to have obstructive sleep apnea during above test/study.  -She was educated to practice good sleep hygiene practices. She was provided with a good sleep hygiene hand out.  -Marguerite Rachel was advised to make earlier appointment with my clinic if she develops any worsening of sleep symptoms. She verbalizes understanding.  -Marguerite Rachel was advised to not to drive any motor vehicles or operate heavy equipment until her sleep symptoms are under good control. [address] verbalizes understanding.  -She was advised to loose weight by controlling diet and doing exercise once cleared by her family physician.   -She was advised to continue current positive airway pressure therapy with above described pressure.  -She advised to keep good compliance with current recommended pressure to get optimal results and clinical improvement.  -She was advised to call DME company regarding supplies if needed.  -She was advised call my office for earlier appointment if needed for worsening of sleep symptoms.  -[address] educated about my impression and plan. Patient verbalizes understanding.

## 2022-03-30 NOTE — PROGRESS NOTES
Chief Complaint: Patient is a new sleep consult, no prior studies    Mallampati airway Class:3  Neck Circumference:.16.5 Inches    [place] sleepiness score 3/30/22: 14  Sleep apnea quality of life questionnaire:.30

## 2022-04-14 ENCOUNTER — HOSPITAL ENCOUNTER (OUTPATIENT)
Dept: CT IMAGING | Age: 47
Discharge: HOME | End: 2022-04-14
Payer: COMMERCIAL

## 2022-04-14 DIAGNOSIS — J32.9 CHRONIC SINUSITIS, UNSPECIFIED LOCATION: ICD-10-CM

## 2022-04-14 DIAGNOSIS — J45.40 MODERATE PERSISTENT ASTHMA, UNSPECIFIED WHETHER COMPLICATED: ICD-10-CM

## 2022-04-14 PROCEDURE — 70486 CT MAXILLOFACIAL W/O DYE: CPT

## 2022-05-04 ENCOUNTER — HOSPITAL ENCOUNTER (OUTPATIENT)
Dept: SLEEP CENTER | Age: 47
Discharge: HOME | End: 2022-05-06
Payer: COMMERCIAL

## 2022-05-04 DIAGNOSIS — R06.81 APNEA: ICD-10-CM

## 2022-05-04 DIAGNOSIS — R06.83 SNORING: ICD-10-CM

## 2022-05-04 DIAGNOSIS — G47.30 SLEEP APNEA, UNSPECIFIED TYPE: ICD-10-CM

## 2022-05-04 DIAGNOSIS — I10 ESSENTIAL HYPERTENSION: ICD-10-CM

## 2022-05-04 PROCEDURE — 95810 POLYSOM 6/> YRS 4/> PARAM: CPT

## 2022-05-05 LAB — STATUS: NORMAL

## 2022-05-05 NOTE — PROGRESS NOTES
Title:  CPAP/BiLevel Titration's    Approved by:  Kamryn Beal MD      Approval Date:  December, 2019 Next Review:  December, 2021     Responsible Party:  [facility] Applies to:   Antonietta Lamas Number:  None    Document Type:  Such as Guideline, Policy, Policy & Procedure, or Procedure, Instructions  Manual:  Policy and Procedures    Section: IV Policy Start Date: October, 9874           POLICY: Positive airway pressure device is used to treat patients with sleep related breathing disorders characterized by full or partial occlusion of the upper airway during sleep. A patient must have undergone polysomnography and diagnosed with obstructive sleep apnea. All individuals who record sleep studies must follow best practices for CPAP/bilevel/ASV titrations in order to attain the ideal pressure setting for their patients. Too low of pressure may cause patients to either be sub-optimally treated or to wake up in a panic. Too much pressure may cause the patient to experience bloating or mask leakage. Determining the appropriate pressure setting for each patient will lead to improved adherence and outcome. Titrations are not an exact science, and it is understood that technologists may need to make minor changes for individual patients. The procedures outlined below are meant to be a guideline and follow the spirit of the AASM clinical guidelines. PROCEDURE:    CPAP:    1. Review the patients pertinent medical al history, previous sleep study or studies to ass the severity of sleep disordered breathing. Review of pertinent information will help to attain a better titration. 2. Applications of electrodes, montages, filters, sensitivities and scoring will be performed according to the current version of the AASM Scoring Manual.   3. Prior to initiating study collect all appropriate PAP supplies  a. Tubing   b.  Humidifier (filled with distilled water)  c. Masks   4. The technologist should assess and measure patient for most appropriate mask prior to start of study. 5. CPAP should be initiated at 5 cm H20.  a. More pressure at start of study may be necessary if patient is morbidly obese or unable to fall asleep on a pressure of 5 cm H20   6. If apneas or frequent hypopneas are present, pressure settings should be increased by 2 cm H20. If occasional hypopneas, snoring, or mask flow limitation are present, pressure settings should be increased by 1 cm H20 and maintained for at least fie minutes to determine if events improve or resolve. Pressure settings may need to be increased more quickly during REM sleep given the limited amount of REM during sleep and the need to treat events during this stage. 7. If a mask leak occurs, the tech should first fix the leakage before raising the pressure. Otherwise, the final pressure setting chosen for the patient may be too high. Once the mask leak has been fixed, decrease the pressure to the last setting where mouth breathing and/or mask leakage was not present, and then re-titrate as indicated. Make sure to document directly on the study the steps taken to resolve the leak and the type of masks used. Pressure setting usually do not need to be set as high with a nasal-mask than with a full-face mask. 8. The recording technologist should document directly on the study at least every 30 minutes. 9. If the patient takes a break from wearing the mask, do not decrease the CPAP pressure on attempted return to sleep unless the patient remains awake for 15 minutes or the patient specifically requests that the pressure be lowered. 10. Do not raise pressure for central apneas. If the patient develops central apneas, pressure setting may need to be lowered. 11. If the patient is unable to tolerate CPAP secondary due to:  a. Persistent mouth breathing despite use of a full-face mask/chin strap  b.  Inability to exhale against higher expiratory pressures (typically beginning anywhere from 15 to 20 cm of H20.  c. Has frequent central apneas, the use of bilevel positive airway pressure may be indicated. Document directly on study why the patient is being switched from CPAP to bilevel. 12. Ensure that supine sleep has been seen on the chosen setting. Going above the chosen setting 1 or 2 cm H20 to show range may be helpful to ensure that the correct pressure has been established. BiLEVEL:    1. Technologist may change from CPAP to bilevel during a study if proven the patient is unable to tolerate CPAP. 2. Review the patients pertinent medical al history, previous sleep study or studies to ass the severity of sleep disordered breathing. Review of pertinent information will help to attain a better titration. 3. Applications of electrodes, montages, filters, sensitivities and scoring will be performed according to the current version of the AASM Scoring Manual.   4. Prior to initiating study collect all appropriate PAP supplies  a. Tubing   b. Humidifier (filled with distilled water)  c. Masks   5. The technologist should assess and measure patient for most appropriate mask prior to start of study. 6. If the patient has not previously been on CPAP, beginning pressures should be 8/4 cm H20 or higher if patient is morbidly obese or unable to fall asleep at lower pressures. If the patient has been previously successfully treated on CPAP start expiratory pressure at therapeutic setting and set inspiratory pressure 4 cm H20 higher. If advancing from CPAP to bilevel during a study expiratory pressure should be set at most successful CPAP setting and inspiratory pressure set 4 cm h20 higher. 7. The standard differential pressure utilized during bilevel titrations typically ranges from 3 to 5 cm H20, with 4 cm H20 being the most common.   Higher differential pressures may be needed in patients who are morbidly obese or who have neuromuscular diseases. 8. If apneas or frequent hypopneas are present, inspiratory and expiratory pressure settings should be increased by 2 cm H20. If occasional hypopneas, snoring, or mask flow limitation are present inspiratory and expiratory pressures settings should be increased by 1 cm h20 and maintained for at least 5 minutes to determine if events improve or resolve. Pressure settings may need to be increased more quickly during REM sleep given the limited amount of REM during sleep and the need to treat events during this stage. 9. If a mask leak occurs, the tech should first fix the leakage before raising the pressure. Otherwise, the final pressure setting chosen for the patient may be too high. Once the mask leak has been fixed, decrease the pressure to the last setting where mouth breathing and/or mask leakage was not present, and then re-titrate as indicated. Make sure to document directly on the study the steps taken to resolve the leak and the type of masks used. Pressure setting usually do not need to be set as high with a nasal-mask than with a full-face mask. 10. The recording technologist should document directly on the study at least every 30 minutes. 11. If the patient takes a break from wearing the mask, do not decrease the CPAP pressure on attempted return to sleep unless the patient remains awake for 15 minutes or the patient specifically requests that the pressure be lowered. 12. Do not raise pressure for central apneas. If the patient develops central apneas, pressure setting may need to be lowered. If the patient has central apneas on bilevel, the use of spontaneous (ST) mode may be indicated. a. ST mode may only be used in 2 cases  i. An order for ST mode with a primary diagnosis of central sleep apnea  ii. During a titration if obstructive events are less than 5/ hour and centrals must be greater than 50% of total respiratory events.    13. Ensure that supine sleep has been seen on the chosen setting. Going above the chosen setting 1 or 2 cm H20 to show range may be helpful to ensure that the correct pressure has been established.

## 2022-05-08 DIAGNOSIS — G47.33 OSA (OBSTRUCTIVE SLEEP APNEA): Primary | ICD-10-CM

## 2022-05-09 NOTE — PROGRESS NOTES
[address] [place] [address]                               SLEEP STUDY REPORT    PATIENT NAME: Les Ding                 :        1975  MED REC NO:   [id number]                           ROOM:  ACCOUNT NO:   [de-identified]                           ADMIT DATE: 2022  PROVIDER:     ANGELINE Squires STUDY:  2022    DIAGNOSTIC POLYSOMNOGRAM REPORT    REFERRING PROVIDER:  Bishop Enamorado MD    HISTORY OF PRESENT ILLNESS:  The patient is a 65-year-old female with  history of obstructive sleep apnea. She was brought to the sleep lab to  re-qualify the polysomnogram with a spilt-night study. BMI 34.5. METHODS:  The patient underwent digital polysomnography in compliance  with the standards and specifications from the AASM Manual including the  simultaneous recording of 3 EEG channels (F4-M1, C4-M1, and O2-M1 with  back up electrodes F3-M2, C3-M2, and O1-M2), 2 EOG channels (E1-M2, and  E2-M1,), EMG (chin, left & right leg), EKG, Nonin pulse oximetry with   less than 2 second averaging time, body position, airflow recorded by  oral-nasal thermal sensor and nasal air pressure transducer, plus  respiratory effort recorded by calibrated respiratory inductance  plethysmography (RIP), flow volume loop, sound and video. Sleep staging  and scoring followed the standard put forth by the American Academy of  Sleep Medicine and utilized the 1B obstructive hypopnea event  desaturation of 4 percent or greater. DETAILS OF THE STUDY:  Lights out 08:52 p.m., lights on 04:43 a.m. Total recording time 471 minutes, sleep period time 438 minutes, total  sleep time 393 minutes, sleep efficiency 83.5%. Latency to sleep 32.6  minutes, wake after sleep onset 45 minutes. Latency to  minutes.     SLEEP STAGING:  The patient slept 31 minutes or 7.9% of total sleep time  in N1 sleep, 290 minutes or 73.7% in N2 sleep, 8.5 minutes or 2.2% in N3  sleep, 64 minutes or 16.2% in REM sleep. RESPIRATORY SUMMARY:  8 obstructive apneas, 49 obstructive hypopneas for  an AHI of 8.7. The REM AHI was 39.4 and the supine AHI was 18.4. BODY POSITION SUMMARY:  182.6 minutes of supine sleep, 100.3 minutes of  left lateral sleep and 111 minutes of right lateral sleep. SLEEP DISTURBANCE SUMMARY:  There were 114 arousals. Out of which 42  were caused by respiratory events, for a respiratory arousal index of  6.4.  72 arousals were spontaneous for an spontaneous arousal index of  11. PERIODIC LIMB MOVEMENT SUMMARY:  There was none. PULSE OXIMETRY SUMMARY:  Mean oxygen saturation during wakefulness  94.5%, during sleep 93.4%. Lowest oxygen saturation 82%. There were  5.6 minutes of oxygen saturation below 88%. ECG SUMMARY:  Normal sinus rhythm. ASSESSMENT:  Obstructive sleep apnea with an AHI of 8.7 which worsen  during REM sleep with a REM AHI of 39.4 associated to a sleep disruption  and nocturnal oxygen desaturation with a saturation dyan of 82% and 5.6  minutes of oxygen saturation below 88%. RECOMMENDATIONS:  Due to the severity of the obstructive events, PAP  therapies are recommended.   The patient will be followed up in the sleep  clinic to review results and if she is agreeable will schedule for PAP  titration polysomnogram.        Maximo Irving M.D.    D: 05/08/2022 20:08:23       T: 05/09/2022 15:46:14     [email or web address]  Job#: [id number]     Doc#: [id number]    CC:

## 2022-05-27 ENCOUNTER — OFFICE VISIT (OUTPATIENT)
Dept: CARDIOLOGY CLINIC | Age: 47
End: 2022-05-27
Payer: COMMERCIAL

## 2022-05-27 VITALS
HEIGHT: 64 IN | WEIGHT: 198.8 LBS | BODY MASS INDEX: 33.94 KG/M2 | SYSTOLIC BLOOD PRESSURE: 143 MMHG | HEART RATE: 63 BPM | DIASTOLIC BLOOD PRESSURE: 81 MMHG

## 2022-05-27 DIAGNOSIS — I10 HYPERTENSION, UNSPECIFIED TYPE: Primary | ICD-10-CM

## 2022-05-27 PROCEDURE — 99213 OFFICE O/P EST LOW 20 MIN: CPT | Performed by: INTERNAL MEDICINE

## 2022-05-27 NOTE — PROGRESS NOTES
[address] [facility] [address]  [place] [address]  Dept: [phone]  Dept Fax: [phone]  Loc: [phone]    Visit Date: 5/27/2022    Ms. Kesha Boyd is a 49 y.o. female  who presented for:  Chief Complaint   Patient presents with    3 Month Follow-Up       HPI:   48 yo F c hx of HTN, PCOS, Anxiety is here for hypertension. Patient has multiple family members who follow up in cardiology clinic. She followed up with cardiologist at [facility]. She was placed on amlodipine/losartan. She thinks she snores regularly. Recently underwent sleep study which showed PING. She is here for a follow up.           Current Outpatient Medications:     CITALOPRAM HYDROBROMIDE PO, Take 10 mg by mouth daily , Disp: , Rfl:     Budeson-Glycopyrrol-Formoterol (BREZTRI AEROSPHERE) 160-9-4.8 MCG/ACT AERO, Inhale into the lungs, Disp: , Rfl:     zinc 50 MG CAPS, Take by mouth, Disp: , Rfl:     Omega-3 Fatty Acids (FISH OIL) 1000 MG CAPS, Take 3,000 mg by mouth 3 times daily, Disp: , Rfl:     Multiple Vitamin (MULTIVITAMIN) capsule, Take 1 capsule by mouth daily, Disp: , Rfl:     rosuvastatin (CRESTOR) 5 MG tablet, TAKE ONE TABLET BY MOUTH ONE TIME DAILY TO HELP LOWER CHOLESTEROL., Disp: , Rfl:     losartan (COZAAR) 100 MG tablet, TAKE ONE TABLET EACH DAY TO HELP CONTROL BLOOD PRESSURE, Disp: , Rfl:     cetirizine (ZYRTEC) 10 MG tablet, Take 10 mg by mouth daily, Disp: , Rfl:     Cholecalciferol (VITAMIN D3) 125 MCG (5000 UT) TABS, Take by mouth, Disp: , Rfl:     vitamin C (ASCORBIC ACID) 500 MG tablet, Take 500 mg by mouth daily, Disp: , Rfl:     NONFORMULARY, orthobiotic, Disp: , Rfl:     amLODIPine (NORVASC) 5 MG tablet, Take 1 tablet by mouth daily, Disp: 30 tablet, Rfl: 5    thyroid (ARMOUR) 30 MG tablet, Take 30 mg by mouth daily, Disp: , Rfl:     famotidine (PEPCID) 20 MG tablet, Take 40 mg by mouth daily as needed , Disp: , Rfl:    Azelastine-Fluticasone (DYMISTA NA), 1 spray by Nasal route daily Indications: EACH NARE, Disp: , Rfl:     PROGESTERONE, VAGINAL, 8 % GEL, Place vaginally daily , Disp: , Rfl:     SERTRALINE HCL PO, Take 100 mg by mouth daily. , Disp: , Rfl:     Past Medical History  Andry Santos  has a past medical history of Acid reflux, Anxiety, Bronchitis, Hypertension, Kidney stones, PCOS (polycystic ovarian syndrome), Reactive airway disease, and Walking pneumonia. Social History  Andry Santos  reports that she has never smoked. She has never used smokeless tobacco. She reports current alcohol use. She reports that she does not use drugs. Family History  Andry Santos family history includes Hearing Loss in her father; Heart Disease in her father; High Blood Pressure in her father; High Cholesterol in her father; Thyroid Disease in her father; Uterine Cancer (age of onset: 54) in her maternal grandmother.     Past Surgical History   Past Surgical History:   Procedure Laterality Date    CYSTO/URETERO/PYELOSCOPY, CALCULUS TX N/A 2/5/2019    CYSTOSCOPY, RIGHT UTEROSCOPY, DILATION OF RIGHT UPJ  AND PROXIMAL URETER, BASKET RETRIEVAL OF STONES, RIGHT URETERAL STENT INSERTION performed by Emelyn Hargrove MD at [address] [address] N/A 2/26/2019    CYSTOSCOPY, RIGHT URETEROSCOPY,  BALLOON DILATION AND PLACEMENT OF RIGHT URETERAL STENT performed by Emelyn Hargrove MD at [address] [place] 12/30/2016    Stent Insertion    ENDOMETRIAL ABLATION     Connye Sole KIDNEY STONE SURGERY      LAPAROSCOPY  2009, 2011    x2    [facility] US GUID NDL BIOPSY RIGHT Right 01/21/2020    benign    [facility] US GUID NDL BIOPSY RIGHT Right 04/16/2018    benign    NASAL SINUS SURGERY  01/05/2016    TUBAL LIGATION      [facility]       Subjective:     REVIEW OF SYSTEMS  Constitutional: denies sweats, chills and fever  HENT: denies  congestion, sinus pressure, sneezing and sore throat. Eyes: denies  pain, discharge, redness and itching. Respiratory: denies apnea, cough  Gastrointestinal: denies blood in stool, constipation, diarrhea   Endocrine: denies cold intolerance, heat intolerance, polydipsia. Genitourinary: denies dysuria, enuresis, flank pain and hematuria. Musculoskeletal: denies arthralgias, joint swelling and neck pain. Neurological: denies numbness and headaches. Psychiatric/Behavioral: denies agitation, confusion, decreased concentration and dysphoric mood    All others reviewed and are negative. Objective:     BP (!) 143/81   Pulse 63   Ht 5' 3.5\" (1.613 m)   Wt 198 lb 12.8 oz (90.2 kg)   BMI 34.66 kg/m²     Wt Readings from Last 3 Encounters:   05/27/22 198 lb 12.8 oz (90.2 kg)   03/30/22 198 lb 3.2 oz (89.9 kg)   01/20/22 200 lb (90.7 kg)     BP Readings from Last 3 Encounters:   05/27/22 (!) 143/81   03/30/22 138/82   01/20/22 132/76       PHYSICAL EXAM  Constitutional: Oriented to person, place, and time. Appears well-developed and well-nourished. HENT:   Head: Normocephalic and atraumatic. Eyes: EOM are normal. Pupils are equal, round, and reactive to light. Neck: Normal range of motion. Neck supple. No JVD present. Cardiovascular: Normal rate , normal heart sounds and intact distal pulses. Pulmonary/Chest: Effort normal and breath sounds normal. No respiratory distress. No wheezes. No rales. Abdominal: Soft. Bowel sounds are normal. No distension. There is no tenderness. Musculoskeletal: Normal range of motion. No edema. Neurological: Alert and oriented to person, place, and time. No cranial nerve deficit. Coordination normal.   Skin: Skin is warm and dry. Psychiatric: Normal mood and affect.        Lab Results   Component Value Date    CKTOTAL 58 11/14/2013    CKTOTAL 59 11/14/2013       Lab Results   Component Value Date    WBC 10.4 02/05/2019    RBC 4.73 02/05/2019    HGB 12.5 02/26/2019    HCT 39.4 02/26/2019    MCV 89.0 02/05/2019    MCH 28.5 02/05/2019    MCHC 32.1 02/05/2019    RDW 13.8 12/23/2016     02/05/2019    MPV 10.0 02/05/2019       Lab Results   Component Value Date     02/05/2019    K 4.2 02/05/2019     02/05/2019    CO2 21 02/05/2019    BUN 14 02/05/2019    LABALBU 4.4 02/05/2019    CREATININE 0.70 05/22/2019    CALCIUM 9.4 02/05/2019    LABGLOM >90 02/05/2019    GLUCOSE 87 03/18/2019       Lab Results   Component Value Date    ALKPHOS 55 02/05/2019    ALT 21 02/05/2019    AST 15 02/05/2019    PROT 7.1 02/05/2019    BILITOT 0.2 02/05/2019    BILITOT NEGATIVE 10/07/2015    BILIDIR <0.2 02/05/2019    LABALBU 4.4 02/05/2019       No results found for: MG    No results found for: INR, PROTIME      Lab Results   Component Value Date    LABA1C 5.4 12/30/2016       Lab Results   Component Value Date    TRIG 277 03/18/2019    HDL 44 03/18/2019    LDLCALC 131 03/18/2019       Lab Results   Component Value Date    TSH 0.751 03/18/2019         Testing Reviewed:      I haveindividually reviewed the below cardiac tests    EKG:    ECHO: No results found for this or any previous visit. STRESS:    CATH:    Assessment/Plan       Diagnosis Orders   1. Hypertension, unspecified type       Hypertension  Anxiety  PING--CPAP  Right ankle issues    Needs sleep study  Continue amlodipine 5mg daily  Continue losartan  Continue crestor  Reviewed lipids  The patient is asked to make an attempt to improve diet and exercise patterns to aid in medical management of this problem. Advised more plant based nutrition/meditarrean diet   Advised patient to call office or seek immediate medical attention if there is any new onset of  any chest pain, sob, palpitations, lightheadedness, dizziness, orthopnea, PND or pedal edema. All medication side effects were discussed in details. Thank youfor allowing me to participate in the care of this patient. Please do not hesitate to contact me for any further questions. Electronically signed by Deric Blackburn MD [facility]  5/27/2022 at 12:31 PM EST

## 2022-07-19 ENCOUNTER — TELEPHONE (OUTPATIENT)
Dept: PULMONOLOGY | Age: 47
End: 2022-07-19

## 2022-07-19 DIAGNOSIS — G47.10 HYPERSOMNIA: ICD-10-CM

## 2022-07-19 DIAGNOSIS — I10 ESSENTIAL HYPERTENSION: ICD-10-CM

## 2022-07-19 DIAGNOSIS — G47.33 OSA (OBSTRUCTIVE SLEEP APNEA): Primary | ICD-10-CM

## 2022-07-19 DIAGNOSIS — F41.9 ANXIETY DISORDER, UNSPECIFIED TYPE: ICD-10-CM

## 2022-08-01 ENCOUNTER — HOSPITAL ENCOUNTER (OUTPATIENT)
Dept: SLEEP CENTER | Age: 47
Discharge: HOME | End: 2022-08-03
Payer: COMMERCIAL

## 2022-08-01 DIAGNOSIS — I10 ESSENTIAL HYPERTENSION: ICD-10-CM

## 2022-08-01 DIAGNOSIS — G47.10 HYPERSOMNIA: ICD-10-CM

## 2022-08-01 DIAGNOSIS — F41.9 ANXIETY DISORDER, UNSPECIFIED TYPE: ICD-10-CM

## 2022-08-01 DIAGNOSIS — G47.33 OSA (OBSTRUCTIVE SLEEP APNEA): ICD-10-CM

## 2022-08-01 PROCEDURE — 95811 POLYSOM 6/>YRS CPAP 4/> PARM: CPT

## 2022-08-02 DIAGNOSIS — G47.33 OSA (OBSTRUCTIVE SLEEP APNEA): Primary | ICD-10-CM

## 2022-08-02 DIAGNOSIS — I10 ESSENTIAL HYPERTENSION: ICD-10-CM

## 2022-08-02 DIAGNOSIS — Z99.89 OSA ON CPAP: ICD-10-CM

## 2022-08-02 DIAGNOSIS — G47.10 HYPERSOMNIA: ICD-10-CM

## 2022-08-02 DIAGNOSIS — F41.9 ANXIETY DISORDER, UNSPECIFIED TYPE: ICD-10-CM

## 2022-08-02 DIAGNOSIS — G47.33 OSA ON CPAP: ICD-10-CM

## 2022-08-02 LAB — STATUS: NORMAL

## 2022-08-03 NOTE — PROGRESS NOTES
[address] [place] [address]                               SLEEP STUDY REPORT    PATIENT NAME: Lilibeth Han                 :        1975  MED REC NO:   [id number]                           ROOM:  ACCOUNT NO:   [de-identified]                           ADMIT DATE: 2022  PROVIDER:     Beny Bustamante MD    DATE OF STUDY:  2022    CPAP TITRATION STUDY REPORT    REFERRING PROVIDER:  Alina Franklin MD    The patient's height is 63.5 inches, weight is 198 pounds with a BMI of  34.5. HISTORY:  The patient is a 62-year-old female who was initially  evaluated by me on 2022. The patient subsequently underwent sleep  study on 2022. The baseline sleep study was read by Gracia Haynes MD.  The patient diagnosed with mild obstructive sleep apnea with  worsening of respiratory events in REM sleep. The patient had  associated comorbidities including hypersomnia, anxiety and  hypertension. The patient is scheduled for CPAP titration. METHODS:  The patient underwent digital polysomnography in compliance  with the standards and specifications from the AASM Manual including the  simultaneous recording of 3 EEG channels (F4-M1, C4-M1, and O2-M1 with  back up electrodes F3-M2, C3-M2, and O1-M2), 2 EOG channels (E1-M2, and  E2-M1,), EMG (chin, left & right leg), EKG, Nonin pulse oximetry with   less than 2 second averaging time, body position, airflow recorded by  oral-nasal thermal sensor and nasal air pressure transducer, plus  respiratory effort recorded by calibrated respiratory inductance  plethysmography (RIP), flow volume loop, sound and video. Sleep staging  and scoring followed the standard put forth by the American Academy of  Sleep Medicine and utilized the 1B obstructive hypopnea event  desaturation of 4 percent or greater.     INTERPRETATION:  This is a CPAP titration study and the study was  performed on 08/01/2022. The study was started at 10:49 p.m. and was  terminated at 05:24 a.m. with a total recording time of 395.1 minutes,  sleep period time was 379.4 minutes, total sleep time was 351.5 minutes,  and overall sleep efficiency was 89%. The sleep onset latency was 15.7  minutes, wake after sleep onset was 27.9 minutes, and REM sleep latency  was 256.5 minutes. SLEEP STAGING AND DISTRIBUTION SUMMARY:  Revealed the patient spent 32.5  minutes in stage I consisting of 9.2% of total sleep time, 245 minutes  in stage II consisting of 69.7%, and 74 minutes in REM sleep consisting  of 21.1% of total sleep time. The patient had absent slow wave sleep. CPAP TITRATION STUDY:  The CPAP titration study was started with a CPAP  pressure of 5 cm of water and the CPAP pressure was gradually increased  to a CPAP pressure of 9 cm of water by titrating to apneas and  hypopneas. At a CPAP pressure of 9 cm of water, the patient spent 1  hour 43 minutes in bed. Out of 1 hour 43 minutes, the patient slept for  a period of 1 hour 6 minutes in REM sleep and 37 minutes in non-REM  sleep. At a CPAP pressure of 9 cm of water, the patient had 1 central  apnea event and 2 obstructive hypopneas with an apnea-hypopnea index of  1.7. The maximum oxygen desaturation recorded at this pressure was 90%  on room air. The mean oxygen saturation was 95.8%. PERIODIC LIMB MOVEMENT ANALYSIS:  Revealed the patient did not have any  periodic limb movements. The patient had a total of 61 spontaneous  arousals with a spontaneous arousal index of 10.4. EKG MONITORING:  Revealed normal sinus rhythm. IMPRESSION:  1. Mild obstructive sleep apnea diagnosed by a sleep study performed on  05/04/2022. The patient had optimal titration to a CPAP pressure of 9  cm of water with room air. 2.  Hypersomnia, most likely due to sleep apnea. 3.  Essential hypertension. 4.  Anxiety disorder.   5.  Severe persistent bronchial asthma. 6.  GERD, on treatment with Pepcid. 7.  Polycystic ovarian syndrome. RECOMMENDATIONS:  1. For the patient's sleep-disordered breathing, we recommend starting  therapy with a CPAP pressure of 9 cm of water with room air. 2.  Specific recommendations include, the patient's choice of interface  was F30 full face mask. 3.  The patient should be scheduled for a followup with my clinic in six  to eight weeks on recommended CPAP therapy for a clinical reevaluation  with review of download. Thanks to Monica Brantley MD, for giving me this opportunity to  participate in the care of this pleasant lady.         Monica Haines MD    D: 08/02/2022 16:05:55       T: 08/03/2022 14:47:59     [email or web address]  Job#: [id number]     Doc#: [id number]    CC:

## 2022-08-04 ENCOUNTER — TELEPHONE (OUTPATIENT)
Dept: SLEEP CENTER | Age: 47
End: 2022-08-04

## 2022-12-03 NOTE — PROGRESS NOTES
[facility] Follow up note    Michael Young                                             Chief complaint and Teller: Michael Young is a 49 y. o.oldfemale came for follow up regarding her sleep apnea. She is currently using her positive airway pressure device with a CPAP pressure of 9cm H20. She denies any problems with machine or mask. She is currently using a nasal mask. She is sleeping well at night with out difficulty. She denies any daytime sleepiness. She is currently working as a teacher during the daytime. She works at [employer]. She teaches fourth grade mathematics    Review of Systems:   General/Constitutional: she gained 2lbs of weight from the last visit with normal appetite. No fever or chills. HENT: Negative. Eyes: Negative. Upper respiratory tract: No nasal stuffiness or post nasal drip. Lower respiratory tract/ lungs: No cough or sputum production. No hemoptysis. Cardiovascular: No palpitations or chest pain. Gastrointestinal: No nausea or vomiting. Neurological: No focal neurologiacal weakness. Extremities: No edema. Musculoskeletal: No complaints. Genitourinary: No complaints. Hematological: Negative. Psychiatric/Behavioral: Negative. Skin: No itching.         Past Medical History:   Diagnosis Date    Acid reflux     Anxiety     Bronchitis     Hypertension     Kidney stones     PCOS (polycystic ovarian syndrome) 2005    Reactive airway disease 1999    Walking pneumonia        Past Surgical History:   Procedure Laterality Date    CYSTO/URETERO/PYELOSCOPY, CALCULUS TX N/A 2/5/2019    CYSTOSCOPY, RIGHT UTEROSCOPY, DILATION OF RIGHT UPJ  AND PROXIMAL URETER, BASKET RETRIEVAL OF STONES, RIGHT URETERAL STENT INSERTION performed by Deshawn Og MD at [address] [place] [address] N/A 2/26/2019    CYSTOSCOPY, RIGHT URETEROSCOPY,  BALLOON DILATION AND PLACEMENT OF RIGHT URETERAL STENT performed by Vanessa Hernández MD at [facility] 19 Left 12/30/2016    Stent Insertion    ENDOMETRIAL ABLATION      KIDNEY STONE SURGERY      LAPAROSCOPY  2009, 2011    x2    YOSEF US GUID NDL BIOPSY RIGHT Right 01/21/2020    benign    YOSEF US GUID NDL BIOPSY RIGHT Right 04/16/2018    benign    NASAL SINUS SURGERY  01/05/2016    TUBAL LIGATION      UPPER GASTROINTESTINAL ENDOSCOPY      [facility]    WISDOM TOOTH EXTRACTION  1996       Social History     Tobacco Use    Smoking status: Never    Smokeless tobacco: Never    Tobacco comments:     Never smoker   Vaping Use    Vaping Use: Never used   Substance Use Topics    Alcohol use: Yes     Alcohol/week: 0.0 standard drinks     Comment: occasional    Drug use: No       No Known Allergies    Current Outpatient Medications   Medication Sig Dispense Refill    MAGNESIUM PO Take 2 capsules by mouth daily      Budeson-Glycopyrrol-Formoterol (BREZTRI AEROSPHERE) 160-9-4.8 MCG/ACT AERO Inhale into the lungs      zinc 50 MG CAPS Take by mouth      Omega-3 Fatty Acids (FISH OIL) 1000 MG CAPS Take 3,000 mg by mouth 3 times daily      Multiple Vitamin (MULTIVITAMIN) capsule Take 1 capsule by mouth daily      rosuvastatin (CRESTOR) 5 MG tablet TAKE ONE TABLET BY MOUTH ONE TIME DAILY TO HELP LOWER CHOLESTEROL.       losartan (COZAAR) 100 MG tablet TAKE ONE TABLET EACH DAY TO HELP CONTROL BLOOD PRESSURE      cetirizine (ZYRTEC) 10 MG tablet Take 10 mg by mouth daily      Cholecalciferol (VITAMIN D3) 125 MCG (5000 UT) TABS Take by mouth      vitamin C (ASCORBIC ACID) 500 MG tablet Take 500 mg by mouth daily      NONFORMULARY orthobiotic      thyroid (ARMOUR) 30 MG tablet Take 30 mg by mouth daily      famotidine (PEPCID) 20 MG tablet Take 40 mg by mouth daily as needed       Azelastine-Fluticasone (DYMISTA NA) 1 spray by Nasal route daily Indications: EACH NARE      PROGESTERONE, VAGINAL, 8 % GEL Place vaginally daily       SERTRALINE HCL PO Take 100 mg by mouth daily. No current facility-administered medications for this visit. Family History   Problem Relation Age of Onset    Hearing Loss Father     Heart Disease Father     High Blood Pressure Father     High Cholesterol Father     Thyroid Disease Father     Uterine Cancer Maternal Grandmother 54    Breast Cancer Neg Hx     Colon Cancer Neg Hx           /82 (Site: Left Upper Arm, Position: Sitting, Cuff Size: Large Adult)   Pulse 83   Temp 98 °F (36.7 °C) (Oral)   Ht 5' 3.5\" (1.613 m)   Wt 200 lb 3.2 oz (90.8 kg)   SpO2 97% Comment: r/a  BMI 34.91 kg/m²   BMI:  Body mass index is 34.91 kg/m². Mallampati airway Class:  2  Neck Circumference:. 17 Inches  [place] sleepiness score /: 3  SAQLI: 87    Physical Exam :  Constitutional: Patient appears moderately built and moderately nourished. No distress. Patient is oriented to person, place, and time. HENT:   Head: Normocephalic and atraumatic. Right Ear: External ear normal.   Left Ear: External ear normal.   Mouth/Throat: Oropharynx is clear and moist.   Eyes: Conjunctivae are normal. Pupils are equal and reactive to light. No scleral icterus. Neck: Neck supple. No JVD present. Cardiovascular: Normal rate, regular rhythm, normal heart sounds. No murmur heard. Pulmonary/Chest: Effort normal and breath sounds normal. No stridor. No respiratory distress. No wheezes. No rales. Abdominal: Soft. Patient exhibits no distension. No tenderness. Musculoskeletal: Normal range of motion. Extremities:Patient exhibits no edema and no tenderness. Lymphadenopathy:  No cervical adenopathy. Neurological: Patient is alert and oriented to person, place, and time. Skin: Skin is warm and dry. Patient is not diaphoretic. Psychiatric: Patient  has a normal mood and affect.     Diagnostic Data:                               SLEEP STUDY REPORT     PATIENT NAME: Gigi Adair                 :        1975  MED REC NO:   [id number] ROOM:  ACCOUNT NO:   [de-identified]                           ADMIT DATE: 08/01/2022  PROVIDER:     Collins Franco. MD Dianna     DATE OF STUDY:  08/01/2022     CPAP TITRATION STUDY REPORT     REFERRING PROVIDER:  Gerri Arzola MD     The patient's height is 63.5 inches, weight is 198 pounds with a BMI of  34.5. IMPRESSION:  1. Mild obstructive sleep apnea diagnosed by a sleep study performed on  05/04/2022. The patient had optimal titration to a CPAP pressure of 9  cm of water with room air. 2.  Hypersomnia, most likely due to sleep apnea. 3.  Essential hypertension. 4.  Anxiety disorder. 5.  Severe persistent bronchial asthma. 6.  GERD, on treatment with Pepcid. 7.  Polycystic ovarian syndrome. Diagnostic Data: CPAP Titration 8/1/22  CPAP Set-up 11/4/22     PAP Download:   Recorded compliance dates:  11/28/22 - 12/27/22  More than 4hour usage compliance was:97%. Average residual Apnea- Hypoapnea index on current pressue was:0.6. PAP Type CPAP   Level  9cmH2O      Average usuage hours per day was:8hrs 7mins. Interface: Nasal mask. Provider:  [x][facility]             []Baylee                        []Yonas          [][facility]                           [][facility]      []Other:        Assesment:  -Mild obstructive sleep apnea on treatment with a CPAP pressure of 9cm H20 - she is using her CPAP device with excellent compliance to >4 hours of therapy. Her respiratory events were under good control with current therapy. Her clinical symptoms improved. She is benefiting from current CPAP therapy and would like to continue the therapy.  -Hx of Hypersomnia ( Excessive daytime sleepiness) due to obstructive sleep apnea- improved  -Essential hypertension on treatment with medications under control  -Anxiety disorder on treatment medications.  -Severe persistent bronchial asthma.   She is currently following with Dr. Georgia Westbrook MD allergy and immunologist specialist [facility] [place].  She is currently on treatment with Breztri Aerosphere 160/9/4.8mcg spray MDI, 2 puffs via inhalation BID along with albuterol HFA 2 puffs every 6 hourly as needed. -GERD on treatment with Pepcid.  -Allergic rhinitis on treatment with Dymista. She follows with Dr. Ellen Jolly physician. She underwent sinus surgery by Dr. Sharyle Morn, MD  -PCOS (polycystic ovarian syndrome)    Recommendations/Plan:  -She was educated and advised to apply her current mask properly and tight enough to minimize leaks.  -Patient will be given a prescription for chin strap to use with her current mask to avoid leaks and to improve her dryness of mouth.  -She was advised to continue current positive airway pressure therapy with above described pressure.  -She was advised to keep good compliance with current recommended pressure to get optimal results and clinical improvement.  -She was advised to continue to practice good sleep hygiene practices.  -She was advised to loose weight by controlling diet and doing exercise.  -Follow with my clinic in 12months for clinical reevaluation with review of download. -She was advised to call [facility] regarding supplies if needed.  -She was advised to call my office for earlier appointment if needed for worsening of sleep symptoms.  -Litzy Johnson was educated about my impression and plan. Patient verbalizes understanding.      -I personally reviewed updated the Past medical hx, Past surgical hx,Social hx, Family hx, Medications, Allergies in the discrete data section of the patient chart along with labs, Pulmonary medicine,Sleep medicine related, Pathological, Microbiological and Radiological investigations.

## 2022-12-30 ENCOUNTER — OFFICE VISIT (OUTPATIENT)
Dept: PULMONOLOGY | Age: 47
End: 2022-12-30
Payer: COMMERCIAL

## 2022-12-30 VITALS
WEIGHT: 200.2 LBS | SYSTOLIC BLOOD PRESSURE: 120 MMHG | TEMPERATURE: 98 F | HEART RATE: 83 BPM | OXYGEN SATURATION: 97 % | DIASTOLIC BLOOD PRESSURE: 82 MMHG | BODY MASS INDEX: 34.18 KG/M2 | HEIGHT: 64 IN

## 2022-12-30 DIAGNOSIS — Z99.89 OSA ON CPAP: Primary | ICD-10-CM

## 2022-12-30 DIAGNOSIS — G47.33 OSA ON CPAP: Primary | ICD-10-CM

## 2022-12-30 PROCEDURE — 3074F SYST BP LT 130 MM HG: CPT | Performed by: INTERNAL MEDICINE

## 2022-12-30 PROCEDURE — 99214 OFFICE O/P EST MOD 30 MIN: CPT | Performed by: INTERNAL MEDICINE

## 2022-12-30 PROCEDURE — 3078F DIAST BP <80 MM HG: CPT | Performed by: INTERNAL MEDICINE

## 2022-12-30 NOTE — PATIENT INSTRUCTIONS
Recommendations/Plan:  -She was educated and advised to apply her current mask properly and tight enough to minimize leaks.  -Patient will be given a prescription for chin strap to use with her current mask to avoid leaks and to improve her dryness of mouth.  -She was advised to continue current positive airway pressure therapy with above described pressure.  -She was advised to keep good compliance with current recommended pressure to get optimal results and clinical improvement.  -She was advised to continue to practice good sleep hygiene practices.  -She was advised to loose weight by controlling diet and doing exercise.  -Follow with my clinic in 12months for clinical reevaluation with review of download. -She was advised to call [facility] regarding supplies if needed.  -She was advised to call my office for earlier appointment if needed for worsening of sleep symptoms.  -Natasha Hussein was educated about my impression and plan. Patient verbalizes understanding.

## 2022-12-30 NOTE — PROGRESS NOTES
Chief Complaint:    Mallampati airway Class:  2  Neck Circumference:. 17 Inches    Dawson sleepiness score 12/30/22: 3  SAQLI: 87    Diagnostic Data: CPAP Titration 8/1/22  CPAP Set-up 11/4/22    PAP Download:   Recorded compliance dates:  11/28/22 - 12/27/22  More than 4hour usage compliance was:97%. Average residual Apnea- Hypoapnea index on current pressue was:0.6. PAP Type CPAP   Level  9cmH2O     Average usuage hours per day was:8hrs 7mins.      Interface: FF    Provider:  [x][facility]  []Baylee  [][facility]  [][facility]id[facility]are         [][facility] []Other:

## 2024-07-15 ENCOUNTER — HOSPITAL ENCOUNTER (OUTPATIENT)
Dept: WOMENS IMAGING | Age: 49
Discharge: HOME | End: 2024-07-15
Payer: COMMERCIAL

## 2024-07-15 VITALS — WEIGHT: 180 LBS | HEIGHT: 64 IN | BODY MASS INDEX: 30.73 KG/M2

## 2024-07-15 DIAGNOSIS — Z12.31 VISIT FOR SCREENING MAMMOGRAM: ICD-10-CM

## 2024-07-15 PROCEDURE — 77063 BREAST TOMOSYNTHESIS BI: CPT

## 2024-09-18 NOTE — TELEPHONE ENCOUNTER
Called patient left message for patient to call the office back to get the results of her 24 hour urine. 27-Sep-2024

## 2025-07-11 NOTE — TELEPHONE ENCOUNTER
Patient had a split night but did not split, her f/u is not until August. Patient states that she called here back in June but there is no message. Patient lives an hour away and if she needs to have a titration would like to go ahead with it. Pump eval next few weeks at [facility] if possible Pump 3-4 months, in person

## (undated) DEVICE — GLOVE SURG SZ 65 THK91MIL LTX FREE SYN POLYISOPRENE

## (undated) DEVICE — GUIDEWIRE ENDOSCP L150CM DIA0.035IN TIP 3CM PTFE NIT

## (undated) DEVICE — BALLOON DILATATION CATHETER KIT: Brand: UROMAX ULTRA KIT

## (undated) DEVICE — CATHETER ANGIO 18FR L4CM RBP 20ATM HYDR+ ULT HI PRSS SHT

## (undated) DEVICE — CYSTO PACK: Brand: MEDLINE INDUSTRIES, INC.

## (undated) DEVICE — BALLOON REPROCESS INFLAT DEVICE ENCORE 26

## (undated) DEVICE — NITINOL STONE RETRIEVAL BASKET: Brand: ZERO TIP

## (undated) DEVICE — YANKAUER,BULB TIP,W/O VENT,RIGID,STERILE: Brand: MEDLINE

## (undated) DEVICE — BASIC SINGLE BASIN BTC-LF: Brand: MEDLINE INDUSTRIES, INC.

## (undated) DEVICE — TUBING, SUCTION, 1/4" X 20', STRAIGHT: Brand: MEDLINE INDUSTRIES, INC.